# Patient Record
Sex: FEMALE | Race: ASIAN | Employment: PART TIME | URBAN - METROPOLITAN AREA
[De-identification: names, ages, dates, MRNs, and addresses within clinical notes are randomized per-mention and may not be internally consistent; named-entity substitution may affect disease eponyms.]

---

## 2018-01-05 ENCOUNTER — OFFICE VISIT (OUTPATIENT)
Dept: FAMILY MEDICINE CLINIC | Facility: CLINIC | Age: 43
End: 2018-01-05

## 2018-01-05 ENCOUNTER — LAB ENCOUNTER (OUTPATIENT)
Dept: LAB | Age: 43
End: 2018-01-05
Attending: FAMILY MEDICINE
Payer: COMMERCIAL

## 2018-01-05 VITALS
SYSTOLIC BLOOD PRESSURE: 108 MMHG | RESPIRATION RATE: 12 BRPM | BODY MASS INDEX: 29.23 KG/M2 | OXYGEN SATURATION: 100 % | HEART RATE: 78 BPM | DIASTOLIC BLOOD PRESSURE: 60 MMHG | WEIGHT: 165 LBS | TEMPERATURE: 98 F | HEIGHT: 63 IN

## 2018-01-05 DIAGNOSIS — Z00.00 ROUTINE ADULT HEALTH MAINTENANCE: ICD-10-CM

## 2018-01-05 DIAGNOSIS — Z28.21 INFLUENZA VACCINATION DECLINED BY PATIENT: ICD-10-CM

## 2018-01-05 DIAGNOSIS — Z12.39 SCREENING FOR MALIGNANT NEOPLASM OF BREAST: ICD-10-CM

## 2018-01-05 DIAGNOSIS — Z00.00 ROUTINE ADULT HEALTH MAINTENANCE: Primary | ICD-10-CM

## 2018-01-05 DIAGNOSIS — Z01.419 ENCOUNTER FOR ANNUAL ROUTINE GYNECOLOGICAL EXAMINATION: ICD-10-CM

## 2018-01-05 DIAGNOSIS — Z13.31 NEGATIVE DEPRESSION SCREENING: ICD-10-CM

## 2018-01-05 LAB
ALBUMIN SERPL-MCNC: 3.6 G/DL (ref 3.5–4.8)
ALP LIVER SERPL-CCNC: 58 U/L (ref 37–98)
ALT SERPL-CCNC: 34 U/L (ref 14–54)
AST SERPL-CCNC: 23 U/L (ref 15–41)
BILIRUB SERPL-MCNC: 0.4 MG/DL (ref 0.1–2)
BUN BLD-MCNC: 13 MG/DL (ref 8–20)
CALCIUM BLD-MCNC: 9.3 MG/DL (ref 8.3–10.3)
CHLORIDE: 108 MMOL/L (ref 101–111)
CHOLEST SMN-MCNC: 145 MG/DL (ref ?–200)
CO2: 25 MMOL/L (ref 22–32)
CREAT BLD-MCNC: 0.66 MG/DL (ref 0.55–1.02)
ERYTHROCYTE [DISTWIDTH] IN BLOOD BY AUTOMATED COUNT: 12.4 % (ref 11.5–16)
FREE T4: 1 NG/DL (ref 0.9–1.8)
GLUCOSE BLD-MCNC: 76 MG/DL (ref 70–99)
HCT VFR BLD AUTO: 42 % (ref 34–50)
HDLC SERPL-MCNC: 61 MG/DL (ref 45–?)
HDLC SERPL: 2.38 {RATIO} (ref ?–4.44)
HGB BLD-MCNC: 13.2 G/DL (ref 12–16)
LDLC SERPL CALC-MCNC: 74 MG/DL (ref ?–130)
M PROTEIN MFR SERPL ELPH: 7.3 G/DL (ref 6.1–8.3)
MCH RBC QN AUTO: 27.7 PG (ref 27–33.2)
MCHC RBC AUTO-ENTMCNC: 31.4 G/DL (ref 31–37)
MCV RBC AUTO: 88.2 FL (ref 81–100)
NONHDLC SERPL-MCNC: 84 MG/DL (ref ?–130)
PLATELET # BLD AUTO: 204 10(3)UL (ref 150–450)
POTASSIUM SERPL-SCNC: 4.1 MMOL/L (ref 3.6–5.1)
RBC # BLD AUTO: 4.76 X10(6)UL (ref 3.8–5.1)
RED CELL DISTRIBUTION WIDTH-SD: 40.2 FL (ref 35.1–46.3)
SODIUM SERPL-SCNC: 139 MMOL/L (ref 136–144)
TRIGL SERPL-MCNC: 49 MG/DL (ref ?–150)
TSI SER-ACNC: 1.65 MIU/ML (ref 0.35–5.5)
VLDLC SERPL CALC-MCNC: 10 MG/DL (ref 5–40)
WBC # BLD AUTO: 6 X10(3) UL (ref 4–13)

## 2018-01-05 PROCEDURE — 80050 GENERAL HEALTH PANEL: CPT | Performed by: FAMILY MEDICINE

## 2018-01-05 PROCEDURE — 36415 COLL VENOUS BLD VENIPUNCTURE: CPT | Performed by: FAMILY MEDICINE

## 2018-01-05 PROCEDURE — 99396 PREV VISIT EST AGE 40-64: CPT | Performed by: FAMILY MEDICINE

## 2018-01-05 PROCEDURE — 84439 ASSAY OF FREE THYROXINE: CPT | Performed by: FAMILY MEDICINE

## 2018-01-05 PROCEDURE — 80061 LIPID PANEL: CPT | Performed by: FAMILY MEDICINE

## 2018-01-05 NOTE — PROGRESS NOTES
HPI:   Krishna Brown is a 40year old female who presents for a complete physical exam. Symptoms: denies discharge, itching, burning or dysuria. Patient has no complaints today.     Wt Readings from Last 6 Encounters:  01/05/18 : 165 lb  08/16/16 : 165 lb denies fevers, weakness, trouble sleeping or weight changes  SKIN: denies any unusual skin lesions or rashes  EYES:denies vision changes  HEENT: denies upper respiratory symptoms  LUNGS: denies cough or shortness of breath with exertion  CHEST:  denies bianca instructed on self breast exam monthly. Patient instructed on getting yearly Pap smear and mammogram.     Patient educated on taking calcium and Vit D supplementation and on osteoporosis and bone density testing.      Aerobic exercise 30 minutes five da

## 2018-01-13 ENCOUNTER — HOSPITAL ENCOUNTER (OUTPATIENT)
Dept: MAMMOGRAPHY | Age: 43
Discharge: HOME | End: 2018-01-13
Attending: FAMILY MEDICINE
Payer: COMMERCIAL

## 2018-01-13 DIAGNOSIS — Z12.39 SCREENING FOR MALIGNANT NEOPLASM OF BREAST: ICD-10-CM

## 2018-01-13 PROCEDURE — 77067 SCR MAMMO BI INCL CAD: CPT | Performed by: FAMILY MEDICINE

## 2018-01-18 ENCOUNTER — OFFICE VISIT (OUTPATIENT)
Dept: FAMILY MEDICINE CLINIC | Facility: CLINIC | Age: 43
End: 2018-01-18

## 2018-01-18 ENCOUNTER — TELEPHONE (OUTPATIENT)
Dept: FAMILY MEDICINE CLINIC | Facility: CLINIC | Age: 43
End: 2018-01-18

## 2018-01-18 ENCOUNTER — LAB ENCOUNTER (OUTPATIENT)
Dept: LAB | Age: 43
End: 2018-01-18
Attending: FAMILY MEDICINE
Payer: COMMERCIAL

## 2018-01-18 VITALS
RESPIRATION RATE: 14 BRPM | TEMPERATURE: 100 F | BODY MASS INDEX: 29 KG/M2 | OXYGEN SATURATION: 100 % | WEIGHT: 165 LBS | HEART RATE: 110 BPM | SYSTOLIC BLOOD PRESSURE: 120 MMHG | DIASTOLIC BLOOD PRESSURE: 60 MMHG

## 2018-01-18 DIAGNOSIS — R68.89 FLU-LIKE SYMPTOMS: Primary | ICD-10-CM

## 2018-01-18 DIAGNOSIS — R05.8 PRODUCTIVE COUGH: ICD-10-CM

## 2018-01-18 DIAGNOSIS — E55.9 VITAMIN D DEFICIENCY: ICD-10-CM

## 2018-01-18 DIAGNOSIS — J02.9 PHARYNGITIS, UNSPECIFIED ETIOLOGY: ICD-10-CM

## 2018-01-18 LAB — 25-HYDROXYVITAMIN D (TOTAL): 33.3 NG/ML (ref 30–100)

## 2018-01-18 PROCEDURE — 87502 INFLUENZA DNA AMP PROBE: CPT | Performed by: FAMILY MEDICINE

## 2018-01-18 PROCEDURE — 99214 OFFICE O/P EST MOD 30 MIN: CPT | Performed by: FAMILY MEDICINE

## 2018-01-18 PROCEDURE — 87798 DETECT AGENT NOS DNA AMP: CPT | Performed by: FAMILY MEDICINE

## 2018-01-18 PROCEDURE — 36415 COLL VENOUS BLD VENIPUNCTURE: CPT | Performed by: FAMILY MEDICINE

## 2018-01-18 PROCEDURE — 82306 VITAMIN D 25 HYDROXY: CPT | Performed by: FAMILY MEDICINE

## 2018-01-18 RX ORDER — OSELTAMIVIR PHOSPHATE 75 MG/1
75 CAPSULE ORAL 2 TIMES DAILY
Qty: 10 CAPSULE | Refills: 0 | Status: SHIPPED | OUTPATIENT
Start: 2018-01-18 | End: 2018-01-23

## 2018-01-18 RX ORDER — AZITHROMYCIN 250 MG/1
TABLET, FILM COATED ORAL
Qty: 6 TABLET | Refills: 0 | Status: SHIPPED | OUTPATIENT
Start: 2018-01-18 | End: 2018-04-27

## 2018-01-18 NOTE — TELEPHONE ENCOUNTER
Ps spouse called would like to speak with nurse regarding a fever pt has had for 2 days , pt taking  motrin and ibuprohen

## 2018-01-18 NOTE — PROGRESS NOTES
HPI:    Patient ID: Raymond Gutierrez is a 40year old female. HPI  Patient presents with:  Fever: started tuesday evening  Headache  Body ache and/or chills  Stomach Pain    Patient states that she does feel fatigued.   Review of Systems  Negative except fo she has flu like illness. Recommend Tamiflu, Z-Mark, Tylenol vitamin C fluids rest.  Follow-up in 1 week if not better. If the test positive may treat family members as prophylaxis.     Orders Placed This Encounter      Vitamin D, 25-Hydroxy [E]      Influ

## 2018-01-18 NOTE — TELEPHONE ENCOUNTER
Called  Jovany Alli back, okay per KELI. Patient started to sneeze more/ have headaches on 1/16/17 at night, yesterday she developed body aches and felt feverish (thermometer broken, could not check), today patient has fever of 102F.      Offered 1515 appt

## 2018-01-19 ENCOUNTER — TELEPHONE (OUTPATIENT)
Dept: FAMILY MEDICINE CLINIC | Facility: CLINIC | Age: 43
End: 2018-01-19

## 2018-01-19 NOTE — TELEPHONE ENCOUNTER
See results below. Spoke with patient regarding lab results. Patient aware, all questions answered.  Patient verbalized understanding     Total time spent on chart: 2 mins

## 2018-01-22 ENCOUNTER — TELEPHONE (OUTPATIENT)
Dept: FAMILY MEDICINE CLINIC | Facility: CLINIC | Age: 43
End: 2018-01-22

## 2018-01-22 NOTE — TELEPHONE ENCOUNTER
Patient was seen 1/18/18 is on tamiflu for positive flu and it is upsetting her stomach - she has nausea and abdominal pain. She is taking the tamiflu with food. Would you recommend anti-nausea medication to help her?

## 2018-01-22 NOTE — TELEPHONE ENCOUNTER
Pt called states she is taking rx tamiflu states it is making her stomach upset , pt would like to know if she can take another rx to help with nausea

## 2018-01-26 ENCOUNTER — HOSPITAL ENCOUNTER (OUTPATIENT)
Dept: MAMMOGRAPHY | Age: 43
Discharge: HOME | End: 2018-01-26
Attending: FAMILY MEDICINE
Payer: COMMERCIAL

## 2018-01-26 ENCOUNTER — HOSPITAL ENCOUNTER (OUTPATIENT)
Dept: ULTRASOUND IMAGING | Age: 43
Discharge: HOME | End: 2018-01-26
Attending: FAMILY MEDICINE
Payer: COMMERCIAL

## 2018-01-26 DIAGNOSIS — R92.2 INCONCLUSIVE MAMMOGRAM: ICD-10-CM

## 2018-01-26 PROCEDURE — 77062 BREAST TOMOSYNTHESIS BI: CPT | Performed by: FAMILY MEDICINE

## 2018-01-26 PROCEDURE — 77066 DX MAMMO INCL CAD BI: CPT | Performed by: FAMILY MEDICINE

## 2018-01-26 PROCEDURE — 76642 ULTRASOUND BREAST LIMITED: CPT | Performed by: FAMILY MEDICINE

## 2018-01-29 DIAGNOSIS — Z12.31 ENCOUNTER FOR SCREENING MAMMOGRAM FOR MALIGNANT NEOPLASM OF BREAST: Primary | ICD-10-CM

## 2018-04-27 ENCOUNTER — OFFICE VISIT (OUTPATIENT)
Dept: OBGYN CLINIC | Facility: CLINIC | Age: 43
End: 2018-04-27

## 2018-04-27 VITALS
WEIGHT: 166.38 LBS | SYSTOLIC BLOOD PRESSURE: 106 MMHG | DIASTOLIC BLOOD PRESSURE: 72 MMHG | HEIGHT: 63.5 IN | BODY MASS INDEX: 29.12 KG/M2

## 2018-04-27 DIAGNOSIS — Z12.4 CERVICAL CANCER SCREENING: ICD-10-CM

## 2018-04-27 DIAGNOSIS — Z01.419 WELL FEMALE EXAM WITH ROUTINE GYNECOLOGICAL EXAM: Primary | ICD-10-CM

## 2018-04-27 PROCEDURE — 88175 CYTOPATH C/V AUTO FLUID REDO: CPT | Performed by: OBSTETRICS & GYNECOLOGY

## 2018-04-27 PROCEDURE — 99386 PREV VISIT NEW AGE 40-64: CPT | Performed by: OBSTETRICS & GYNECOLOGY

## 2018-04-27 PROCEDURE — 87624 HPV HI-RISK TYP POOLED RSLT: CPT | Performed by: OBSTETRICS & GYNECOLOGY

## 2018-04-27 NOTE — PATIENT INSTRUCTIONS
Breast Health: Breast Self-Awareness  What is breast self-awareness? Breast self-awareness is knowing how your breasts normally look and feel. Your breasts change as you go through different stages of your life.  So it’s important to learn what is normal It’s normal to be upset if you find a lump. But it’s important to contact your provider right away. Remember that most breast lumps are benign. This means they are not cancer. Date Last Reviewed: 8/1/2017  © 5686-1111 [employer] [address].  [address] As a woman ages, her ovaries begin to make hormones less regularly. In some months, there may not be ovulation. Without ovulation, progesterone isn't secreted so a normal period doesn't occur. The menstrual cycle will be harder to predict.  Over time, the o © 9002-7669 [employer] [address]. [address] [facility] [place] [facility] [place]. All rights reserved. This information is not intended as a substitute for professional medical care. Always follow your healthcare professional's instructions.

## 2018-04-27 NOTE — PROGRESS NOTES
GYN H&P     2018  9:54 AM    CC: Patient is here for annual    HPI: patient is a 40year old  here for her annual gyn exam.   She has no complaints. Menses are regular. Denies any pelvic pain or irregular vaginal discharge.    Contraception: non GI: denies abdominal pain, diarrhea, constipation  : no complaints, denies dysuria, increased urinary frequency.  Menses regular, no dysmenorrhea, no menorrhagia, no dyspareunia   Hematological/lymphatic: denies history of excessive bleeding or bruising counseled on diet/exercise       1. Well female exam with routine gynecological exam  Had normal mammo this year  - THINPREP PAP SMEAR B; Future  - HPV HIGH RISK , THIN PREP COLLECTION; Future    2.  Cervical cancer screening  - THINPREP PAP SMEAR B; Future

## 2018-06-15 ENCOUNTER — OFFICE VISIT (OUTPATIENT)
Dept: OBGYN CLINIC | Facility: CLINIC | Age: 43
End: 2018-06-15

## 2018-06-15 VITALS
HEIGHT: 64 IN | SYSTOLIC BLOOD PRESSURE: 118 MMHG | DIASTOLIC BLOOD PRESSURE: 84 MMHG | WEIGHT: 168 LBS | BODY MASS INDEX: 28.68 KG/M2

## 2018-06-15 DIAGNOSIS — N92.6 IRREGULAR MENSES: Primary | ICD-10-CM

## 2018-06-15 DIAGNOSIS — Z87.42 HISTORY OF PCOS: ICD-10-CM

## 2018-06-15 PROCEDURE — 84443 ASSAY THYROID STIM HORMONE: CPT | Performed by: NURSE PRACTITIONER

## 2018-06-15 PROCEDURE — 81025 URINE PREGNANCY TEST: CPT | Performed by: NURSE PRACTITIONER

## 2018-06-15 PROCEDURE — 84439 ASSAY OF FREE THYROXINE: CPT | Performed by: NURSE PRACTITIONER

## 2018-06-15 PROCEDURE — 99213 OFFICE O/P EST LOW 20 MIN: CPT | Performed by: NURSE PRACTITIONER

## 2018-06-15 PROCEDURE — 83001 ASSAY OF GONADOTROPIN (FSH): CPT | Performed by: NURSE PRACTITIONER

## 2018-06-15 PROCEDURE — 82670 ASSAY OF TOTAL ESTRADIOL: CPT | Performed by: NURSE PRACTITIONER

## 2018-06-15 NOTE — PROGRESS NOTES
S:  Patient is here after skipping her May menses. Her menses have been regular, last normal menses was 4/2018. She did have 1 day of spotting 6/11/2018, it was brown in nature. No other symptoms aside from some acne to her back.     She states she has a h

## 2019-08-28 ENCOUNTER — OFFICE VISIT (OUTPATIENT)
Dept: OBGYN CLINIC | Facility: CLINIC | Age: 44
End: 2019-08-28
Payer: COMMERCIAL

## 2019-08-28 VITALS
HEIGHT: 63 IN | HEART RATE: 79 BPM | BODY MASS INDEX: 30.44 KG/M2 | SYSTOLIC BLOOD PRESSURE: 108 MMHG | DIASTOLIC BLOOD PRESSURE: 64 MMHG | WEIGHT: 171.81 LBS

## 2019-08-28 DIAGNOSIS — Z01.419 WELL FEMALE EXAM WITH ROUTINE GYNECOLOGICAL EXAM: Primary | ICD-10-CM

## 2019-08-28 DIAGNOSIS — Z12.4 CERVICAL CANCER SCREENING: ICD-10-CM

## 2019-08-28 DIAGNOSIS — Z12.39 BREAST CANCER SCREENING: ICD-10-CM

## 2019-08-28 PROCEDURE — 88175 CYTOPATH C/V AUTO FLUID REDO: CPT | Performed by: OBSTETRICS & GYNECOLOGY

## 2019-08-28 PROCEDURE — 99396 PREV VISIT EST AGE 40-64: CPT | Performed by: OBSTETRICS & GYNECOLOGY

## 2019-08-28 PROCEDURE — 87624 HPV HI-RISK TYP POOLED RSLT: CPT | Performed by: OBSTETRICS & GYNECOLOGY

## 2019-08-28 NOTE — PROGRESS NOTES
GYN H&P     2019  10:39 AM    CC: Patient is here for annual    HPI: patient is a 38year old  here for her annual gyn exam.   She has no complaints. Menses are regular. Denies any pelvic pain or irregular vaginal discharge.    Contraception: no Alcohol use: Yes        Frequency: Monthly or less        Drinks per session: 1 or 2        Binge frequency: Less than monthly        Comment: once every 2 months      Drug use: No      Sexual activity: Yes        Partners: Male    Lifestyle      Physical menorrhagia, no dyspareunia   Hematological/lymphatic: denies history of excessive bleeding or bruising, denies dizziness, lightheadedness.    Breast: denies rashes, skin changes, pain, lumps or discharge   Psychiatric: denies depression, changes in sleep p BILAT (CPT=77067/21094); Future  - THINPREP PAP SMEAR B; Future  - HPV HIGH RISK , THIN PREP COLLECTION; Future    2. Breast cancer screening    - [facility] EDUARDO 2D+3D SCREENING BILAT (CPT=77067/43559); Future    3.  Cervical cancer screening    - THINPREP PAP SM

## 2019-08-29 LAB — HPV I/H RISK 1 DNA SPEC QL NAA+PROBE: NEGATIVE

## 2019-09-03 NOTE — PROGRESS NOTES
Results reviewed. Tests show no significant abnormalities. Patient informed via [facility] staff. Patient will call the office with any questions.

## 2019-09-16 ENCOUNTER — LAB ENCOUNTER (OUTPATIENT)
Dept: LAB | Age: 44
End: 2019-09-16
Attending: FAMILY MEDICINE
Payer: COMMERCIAL

## 2019-09-16 ENCOUNTER — OFFICE VISIT (OUTPATIENT)
Dept: FAMILY MEDICINE CLINIC | Facility: CLINIC | Age: 44
End: 2019-09-16
Payer: COMMERCIAL

## 2019-09-16 VITALS
TEMPERATURE: 98 F | HEIGHT: 63 IN | SYSTOLIC BLOOD PRESSURE: 101 MMHG | DIASTOLIC BLOOD PRESSURE: 80 MMHG | BODY MASS INDEX: 30.51 KG/M2 | HEART RATE: 72 BPM | WEIGHT: 172.19 LBS

## 2019-09-16 DIAGNOSIS — E55.9 VITAMIN D DEFICIENCY: ICD-10-CM

## 2019-09-16 DIAGNOSIS — Z00.00 ROUTINE PHYSICAL EXAMINATION: Primary | ICD-10-CM

## 2019-09-16 DIAGNOSIS — Z13.6 ISCHEMIC HEART DISEASE SCREEN: ICD-10-CM

## 2019-09-16 DIAGNOSIS — R12 HEARTBURN: ICD-10-CM

## 2019-09-16 DIAGNOSIS — Z00.00 ROUTINE PHYSICAL EXAMINATION: ICD-10-CM

## 2019-09-16 LAB
ALBUMIN SERPL-MCNC: 3.6 G/DL (ref 3.4–5)
ALBUMIN/GLOB SERPL: 1.1 {RATIO} (ref 1–2)
ALP LIVER SERPL-CCNC: 64 U/L (ref 37–98)
ALT SERPL-CCNC: 35 U/L (ref 13–56)
ANION GAP SERPL CALC-SCNC: 5 MMOL/L (ref 0–18)
AST SERPL-CCNC: 23 U/L (ref 15–37)
BASOPHILS # BLD AUTO: 0.02 X10(3) UL (ref 0–0.2)
BASOPHILS NFR BLD AUTO: 0.4 %
BILIRUB SERPL-MCNC: 0.5 MG/DL (ref 0.1–2)
BUN BLD-MCNC: 15 MG/DL (ref 7–18)
BUN/CREAT SERPL: 23.4 (ref 10–20)
CALCIUM BLD-MCNC: 8.7 MG/DL (ref 8.5–10.1)
CHLORIDE SERPL-SCNC: 107 MMOL/L (ref 98–112)
CHOLEST SMN-MCNC: 164 MG/DL (ref ?–200)
CO2 SERPL-SCNC: 27 MMOL/L (ref 21–32)
CREAT BLD-MCNC: 0.64 MG/DL (ref 0.55–1.02)
DEPRECATED RDW RBC AUTO: 40.7 FL (ref 35.1–46.3)
EOSINOPHIL # BLD AUTO: 0.1 X10(3) UL (ref 0–0.7)
EOSINOPHIL NFR BLD AUTO: 2 %
ERYTHROCYTE [DISTWIDTH] IN BLOOD BY AUTOMATED COUNT: 12.5 % (ref 11–15)
GLOBULIN PLAS-MCNC: 3.4 G/DL (ref 2.8–4.4)
GLUCOSE BLD-MCNC: 86 MG/DL (ref 70–99)
HCT VFR BLD AUTO: 40.9 % (ref 35–48)
HDLC SERPL-MCNC: 57 MG/DL (ref 40–59)
HGB BLD-MCNC: 13 G/DL (ref 12–16)
IMM GRANULOCYTES # BLD AUTO: 0.01 X10(3) UL (ref 0–1)
IMM GRANULOCYTES NFR BLD: 0.2 %
LDLC SERPL CALC-MCNC: 90 MG/DL (ref ?–100)
LYMPHOCYTES # BLD AUTO: 1.55 X10(3) UL (ref 1–4)
LYMPHOCYTES NFR BLD AUTO: 31.6 %
M PROTEIN MFR SERPL ELPH: 7 G/DL (ref 6.4–8.2)
MCH RBC QN AUTO: 28.2 PG (ref 26–34)
MCHC RBC AUTO-ENTMCNC: 31.8 G/DL (ref 31–37)
MCV RBC AUTO: 88.7 FL (ref 80–100)
MONOCYTES # BLD AUTO: 0.38 X10(3) UL (ref 0.1–1)
MONOCYTES NFR BLD AUTO: 7.7 %
NEUTROPHILS # BLD AUTO: 2.85 X10 (3) UL (ref 1.5–7.7)
NEUTROPHILS # BLD AUTO: 2.85 X10(3) UL (ref 1.5–7.7)
NEUTROPHILS NFR BLD AUTO: 58.1 %
NONHDLC SERPL-MCNC: 107 MG/DL (ref ?–130)
OSMOLALITY SERPL CALC.SUM OF ELEC: 288 MOSM/KG (ref 275–295)
PLATELET # BLD AUTO: 190 10(3)UL (ref 150–450)
POTASSIUM SERPL-SCNC: 4 MMOL/L (ref 3.5–5.1)
RBC # BLD AUTO: 4.61 X10(6)UL (ref 3.8–5.3)
SODIUM SERPL-SCNC: 139 MMOL/L (ref 136–145)
T4 FREE SERPL-MCNC: 1 NG/DL (ref 0.8–1.7)
TRIGL SERPL-MCNC: 84 MG/DL (ref 30–149)
TSI SER-ACNC: 1.87 MIU/ML (ref 0.36–3.74)
VIT D+METAB SERPL-MCNC: 48.1 NG/ML (ref 30–100)
VLDLC SERPL CALC-MCNC: 17 MG/DL (ref 0–30)
WBC # BLD AUTO: 4.9 X10(3) UL (ref 4–11)

## 2019-09-16 PROCEDURE — 80061 LIPID PANEL: CPT | Performed by: FAMILY MEDICINE

## 2019-09-16 PROCEDURE — 82306 VITAMIN D 25 HYDROXY: CPT | Performed by: FAMILY MEDICINE

## 2019-09-16 PROCEDURE — 36415 COLL VENOUS BLD VENIPUNCTURE: CPT | Performed by: FAMILY MEDICINE

## 2019-09-16 PROCEDURE — 99396 PREV VISIT EST AGE 40-64: CPT | Performed by: FAMILY MEDICINE

## 2019-09-16 PROCEDURE — 80050 GENERAL HEALTH PANEL: CPT | Performed by: FAMILY MEDICINE

## 2019-09-16 PROCEDURE — 99213 OFFICE O/P EST LOW 20 MIN: CPT | Performed by: FAMILY MEDICINE

## 2019-09-16 PROCEDURE — 84439 ASSAY OF FREE THYROXINE: CPT | Performed by: FAMILY MEDICINE

## 2019-09-16 NOTE — PROGRESS NOTES
HPI:   Kaleb Du is a 38year old female who presents for a complete physical exam.    Patient has complaint of having some acidity issues with some heartburn and upper abdominal pain off and on for the past 1 year.   It is very mild and happens just on Ordering Location:     [address] [place] [address],      Received:            08/29/2019 08:25 AM                                 [address] [place] [address]                                                     First Screen:          Adelita Herrera denies bleeding abnormalities  ENDOCRINE: denies temperature intolerance, polyuria, or excessive sweating.   LYMPHATICS: denies swollen glands      EXAM:   /80 (BP Location: Left arm, Patient Position: Sitting)   Pulse 72   Temp 98.3 °F (36.8 °C) (Tem may not be anywhere close to 100%. Could be more in the 30% range. For weight reduction and also for management of acidity. She can use Pepcid or Zantac as needed for now. We will have patient do complete physical lab work fasting today.     Patient i

## 2021-01-18 ENCOUNTER — OFFICE VISIT (OUTPATIENT)
Dept: FAMILY MEDICINE CLINIC | Facility: CLINIC | Age: 46
End: 2021-01-18
Payer: COMMERCIAL

## 2021-01-18 VITALS
HEIGHT: 63 IN | WEIGHT: 169 LBS | HEART RATE: 68 BPM | TEMPERATURE: 98 F | DIASTOLIC BLOOD PRESSURE: 74 MMHG | RESPIRATION RATE: 16 BRPM | SYSTOLIC BLOOD PRESSURE: 118 MMHG | BODY MASS INDEX: 29.95 KG/M2

## 2021-01-18 DIAGNOSIS — E55.9 VITAMIN D DEFICIENCY: ICD-10-CM

## 2021-01-18 DIAGNOSIS — R45.86 MOOD SWINGS: ICD-10-CM

## 2021-01-18 DIAGNOSIS — Z12.31 ENCOUNTER FOR SCREENING MAMMOGRAM FOR MALIGNANT NEOPLASM OF BREAST: ICD-10-CM

## 2021-01-18 DIAGNOSIS — Z78.9 VEGETARIAN DIET: ICD-10-CM

## 2021-01-18 DIAGNOSIS — Z00.00 ROUTINE ADULT HEALTH MAINTENANCE: Primary | ICD-10-CM

## 2021-01-18 PROCEDURE — 3078F DIAST BP <80 MM HG: CPT | Performed by: FAMILY MEDICINE

## 2021-01-18 PROCEDURE — 3074F SYST BP LT 130 MM HG: CPT | Performed by: FAMILY MEDICINE

## 2021-01-18 PROCEDURE — 99213 OFFICE O/P EST LOW 20 MIN: CPT | Performed by: FAMILY MEDICINE

## 2021-01-18 PROCEDURE — 99396 PREV VISIT EST AGE 40-64: CPT | Performed by: FAMILY MEDICINE

## 2021-01-18 PROCEDURE — 3008F BODY MASS INDEX DOCD: CPT | Performed by: FAMILY MEDICINE

## 2021-01-18 RX ORDER — FLUOXETINE HYDROCHLORIDE 20 MG/1
20 CAPSULE ORAL DAILY
Qty: 30 CAPSULE | Refills: 1 | Status: SHIPPED | OUTPATIENT
Start: 2021-01-18

## 2021-01-18 NOTE — PROGRESS NOTES
HPI:   Homar Bird is a 41year old female who presents for a complete physical exam.     Patient complains of having some mood swings around the time of her menstrual cycle. She does get regular menstrual cycle monthly.   She denies any depression or key 40.9 35.0 - 48.0 %    .0 150.0 - 450.0 10(3)uL    MCV 88.7 80.0 - 100.0 fL    MCH 28.2 26.0 - 34.0 pg    MCHC 31.8 31.0 - 37.0 g/dL    RDW 12.5 11.0 - 15.0 %    RDW-SD 40.7 35.1 - 46.3 fL    Neutrophil Absolute Prelim 2.85 1.50 - 7.70 x10 (3) uL problems, vaginal discharge or discomfort   MUSCULOSKELETAL: denies joint pain or stiffness  NEURO: denies headaches, tingling or dizziness  PSYCHE: denies depression or anxiety  HEMATOLOGIC: denies bleeding abnormalities  ENDOCRINE: denies temperature int (CPT=77067/53873); Future    Mood swings    Other orders  -     FLUoxetine HCl 20 MG Oral Cap; Take 1 capsule (20 mg total) by mouth daily. Patient counseled at length regarding vitamin D deficiency.   Patient is vegetarian we will check B12 level as w Strong peripheral pulses

## 2021-01-19 ENCOUNTER — LAB ENCOUNTER (OUTPATIENT)
Dept: LAB | Age: 46
End: 2021-01-19
Attending: FAMILY MEDICINE
Payer: COMMERCIAL

## 2021-01-19 DIAGNOSIS — E55.9 VITAMIN D DEFICIENCY: ICD-10-CM

## 2021-01-19 DIAGNOSIS — Z78.9 VEGETARIAN DIET: ICD-10-CM

## 2021-01-19 DIAGNOSIS — Z00.00 ROUTINE ADULT HEALTH MAINTENANCE: ICD-10-CM

## 2021-01-19 LAB
ALBUMIN SERPL-MCNC: 3.6 G/DL (ref 3.4–5)
ALBUMIN/GLOB SERPL: 1 {RATIO} (ref 1–2)
ALP LIVER SERPL-CCNC: 75 U/L
ALT SERPL-CCNC: 26 U/L
ANION GAP SERPL CALC-SCNC: 1 MMOL/L (ref 0–18)
AST SERPL-CCNC: 18 U/L (ref 15–37)
BASOPHILS # BLD AUTO: 0.02 X10(3) UL (ref 0–0.2)
BASOPHILS NFR BLD AUTO: 0.4 %
BILIRUB SERPL-MCNC: 0.5 MG/DL (ref 0.1–2)
BUN BLD-MCNC: 13 MG/DL (ref 7–18)
BUN/CREAT SERPL: 20.3 (ref 10–20)
CALCIUM BLD-MCNC: 9.3 MG/DL (ref 8.5–10.1)
CHLORIDE SERPL-SCNC: 109 MMOL/L (ref 98–112)
CHOLEST SMN-MCNC: 158 MG/DL (ref ?–200)
CO2 SERPL-SCNC: 28 MMOL/L (ref 21–32)
CREAT BLD-MCNC: 0.64 MG/DL
DEPRECATED RDW RBC AUTO: 41 FL (ref 35.1–46.3)
EOSINOPHIL # BLD AUTO: 0.06 X10(3) UL (ref 0–0.7)
EOSINOPHIL NFR BLD AUTO: 1.1 %
ERYTHROCYTE [DISTWIDTH] IN BLOOD BY AUTOMATED COUNT: 12.5 % (ref 11–15)
GLOBULIN PLAS-MCNC: 3.5 G/DL (ref 2.8–4.4)
GLUCOSE BLD-MCNC: 95 MG/DL (ref 70–99)
HCT VFR BLD AUTO: 42.3 %
HDLC SERPL-MCNC: 65 MG/DL (ref 40–59)
HGB BLD-MCNC: 13.3 G/DL
IMM GRANULOCYTES # BLD AUTO: 0.01 X10(3) UL (ref 0–1)
IMM GRANULOCYTES NFR BLD: 0.2 %
LDLC SERPL CALC-MCNC: 78 MG/DL (ref ?–100)
LYMPHOCYTES # BLD AUTO: 1.53 X10(3) UL (ref 1–4)
LYMPHOCYTES NFR BLD AUTO: 28.5 %
M PROTEIN MFR SERPL ELPH: 7.1 G/DL (ref 6.4–8.2)
MCH RBC QN AUTO: 28.4 PG (ref 26–34)
MCHC RBC AUTO-ENTMCNC: 31.4 G/DL (ref 31–37)
MCV RBC AUTO: 90.2 FL
MONOCYTES # BLD AUTO: 0.35 X10(3) UL (ref 0.1–1)
MONOCYTES NFR BLD AUTO: 6.5 %
NEUTROPHILS # BLD AUTO: 3.4 X10 (3) UL (ref 1.5–7.7)
NEUTROPHILS # BLD AUTO: 3.4 X10(3) UL (ref 1.5–7.7)
NEUTROPHILS NFR BLD AUTO: 63.3 %
NONHDLC SERPL-MCNC: 93 MG/DL (ref ?–130)
OSMOLALITY SERPL CALC.SUM OF ELEC: 286 MOSM/KG (ref 275–295)
PATIENT FASTING Y/N/NP: YES
PATIENT FASTING Y/N/NP: YES
PLATELET # BLD AUTO: 186 10(3)UL (ref 150–450)
POTASSIUM SERPL-SCNC: 4 MMOL/L (ref 3.5–5.1)
RBC # BLD AUTO: 4.69 X10(6)UL
SODIUM SERPL-SCNC: 138 MMOL/L (ref 136–145)
T4 FREE SERPL-MCNC: 1 NG/DL (ref 0.8–1.7)
TRIGL SERPL-MCNC: 77 MG/DL (ref 30–149)
TSI SER-ACNC: 1.82 MIU/ML (ref 0.36–3.74)
VIT B12 SERPL-MCNC: 737 PG/ML (ref 193–986)
VIT D+METAB SERPL-MCNC: 71.8 NG/ML (ref 30–100)
VLDLC SERPL CALC-MCNC: 15 MG/DL (ref 0–30)
WBC # BLD AUTO: 5.4 X10(3) UL (ref 4–11)

## 2021-01-19 PROCEDURE — 85025 COMPLETE CBC W/AUTO DIFF WBC: CPT

## 2021-01-19 PROCEDURE — 82306 VITAMIN D 25 HYDROXY: CPT

## 2021-01-19 PROCEDURE — 36415 COLL VENOUS BLD VENIPUNCTURE: CPT

## 2021-01-19 PROCEDURE — 80053 COMPREHEN METABOLIC PANEL: CPT

## 2021-01-19 PROCEDURE — 84443 ASSAY THYROID STIM HORMONE: CPT

## 2021-01-19 PROCEDURE — 80061 LIPID PANEL: CPT

## 2021-01-19 PROCEDURE — 82607 VITAMIN B-12: CPT

## 2021-01-19 PROCEDURE — 84439 ASSAY OF FREE THYROXINE: CPT

## 2021-01-20 PROBLEM — R45.86 MOOD SWINGS: Status: ACTIVE | Noted: 2021-01-20

## 2023-04-12 ENCOUNTER — OFFICE VISIT (OUTPATIENT)
Dept: FAMILY MEDICINE CLINIC | Facility: CLINIC | Age: 48
End: 2023-04-12
Payer: COMMERCIAL

## 2023-04-12 VITALS
HEART RATE: 72 BPM | WEIGHT: 179.5 LBS | HEIGHT: 63 IN | DIASTOLIC BLOOD PRESSURE: 80 MMHG | BODY MASS INDEX: 31.8 KG/M2 | RESPIRATION RATE: 16 BRPM | SYSTOLIC BLOOD PRESSURE: 122 MMHG | TEMPERATURE: 98 F

## 2023-04-12 DIAGNOSIS — G89.29 CHRONIC BILATERAL LOW BACK PAIN WITHOUT SCIATICA: ICD-10-CM

## 2023-04-12 DIAGNOSIS — M54.50 CHRONIC BILATERAL LOW BACK PAIN WITHOUT SCIATICA: ICD-10-CM

## 2023-04-12 DIAGNOSIS — Z00.00 ROUTINE ADULT HEALTH MAINTENANCE: Primary | ICD-10-CM

## 2023-04-12 DIAGNOSIS — R53.83 OTHER FATIGUE: ICD-10-CM

## 2023-04-12 PROCEDURE — 3074F SYST BP LT 130 MM HG: CPT | Performed by: FAMILY MEDICINE

## 2023-04-12 PROCEDURE — 3079F DIAST BP 80-89 MM HG: CPT | Performed by: FAMILY MEDICINE

## 2023-04-12 PROCEDURE — 99213 OFFICE O/P EST LOW 20 MIN: CPT | Performed by: FAMILY MEDICINE

## 2023-04-12 PROCEDURE — 99396 PREV VISIT EST AGE 40-64: CPT | Performed by: FAMILY MEDICINE

## 2023-04-12 PROCEDURE — 3008F BODY MASS INDEX DOCD: CPT | Performed by: FAMILY MEDICINE

## 2023-04-15 ENCOUNTER — LAB ENCOUNTER (OUTPATIENT)
Dept: LAB | Age: 48
End: 2023-04-15
Attending: FAMILY MEDICINE
Payer: COMMERCIAL

## 2023-04-15 DIAGNOSIS — Z00.00 ROUTINE ADULT HEALTH MAINTENANCE: ICD-10-CM

## 2023-04-15 DIAGNOSIS — R53.83 OTHER FATIGUE: ICD-10-CM

## 2023-04-15 LAB
ALBUMIN SERPL-MCNC: 3.4 G/DL (ref 3.4–5)
ALBUMIN/GLOB SERPL: 0.9 {RATIO} (ref 1–2)
ALP LIVER SERPL-CCNC: 77 U/L
ALT SERPL-CCNC: 39 U/L
ANION GAP SERPL CALC-SCNC: 3 MMOL/L (ref 0–18)
AST SERPL-CCNC: 22 U/L (ref 15–37)
BILIRUB SERPL-MCNC: 0.5 MG/DL (ref 0.1–2)
BUN BLD-MCNC: 12 MG/DL (ref 7–18)
CALCIUM BLD-MCNC: 9 MG/DL (ref 8.5–10.1)
CHLORIDE SERPL-SCNC: 108 MMOL/L (ref 98–112)
CHOLEST SERPL-MCNC: 167 MG/DL (ref ?–200)
CO2 SERPL-SCNC: 27 MMOL/L (ref 21–32)
CREAT BLD-MCNC: 0.72 MG/DL
ERYTHROCYTE [DISTWIDTH] IN BLOOD BY AUTOMATED COUNT: 12.6 %
FASTING PATIENT LIPID ANSWER: YES
FASTING STATUS PATIENT QL REPORTED: YES
FOLATE SERPL-MCNC: 21.8 NG/ML (ref 8.7–?)
GFR SERPLBLD BASED ON 1.73 SQ M-ARVRAT: 104 ML/MIN/1.73M2 (ref 60–?)
GLOBULIN PLAS-MCNC: 3.6 G/DL (ref 2.8–4.4)
GLUCOSE BLD-MCNC: 97 MG/DL (ref 70–99)
HCT VFR BLD AUTO: 41.3 %
HDLC SERPL-MCNC: 65 MG/DL (ref 40–59)
HGB BLD-MCNC: 12.9 G/DL
LDLC SERPL CALC-MCNC: 89 MG/DL (ref ?–100)
MCH RBC QN AUTO: 26.7 PG (ref 26–34)
MCHC RBC AUTO-ENTMCNC: 31.2 G/DL (ref 31–37)
MCV RBC AUTO: 85.5 FL
NONHDLC SERPL-MCNC: 102 MG/DL (ref ?–130)
OSMOLALITY SERPL CALC.SUM OF ELEC: 286 MOSM/KG (ref 275–295)
PLATELET # BLD AUTO: 219 10(3)UL (ref 150–450)
POTASSIUM SERPL-SCNC: 4 MMOL/L (ref 3.5–5.1)
PROT SERPL-MCNC: 7 G/DL (ref 6.4–8.2)
RBC # BLD AUTO: 4.83 X10(6)UL
SODIUM SERPL-SCNC: 138 MMOL/L (ref 136–145)
T4 FREE SERPL-MCNC: 1 NG/DL (ref 0.8–1.7)
TRIGL SERPL-MCNC: 68 MG/DL (ref 30–149)
TSI SER-ACNC: 1.47 MIU/ML (ref 0.36–3.74)
VIT B12 SERPL-MCNC: 623 PG/ML (ref 193–986)
VIT D+METAB SERPL-MCNC: 54.5 NG/ML (ref 30–100)
VLDLC SERPL CALC-MCNC: 11 MG/DL (ref 0–30)
WBC # BLD AUTO: 5 X10(3) UL (ref 4–11)

## 2023-04-15 PROCEDURE — 82306 VITAMIN D 25 HYDROXY: CPT | Performed by: FAMILY MEDICINE

## 2023-04-15 PROCEDURE — 80061 LIPID PANEL: CPT | Performed by: FAMILY MEDICINE

## 2023-04-15 PROCEDURE — 80050 GENERAL HEALTH PANEL: CPT | Performed by: FAMILY MEDICINE

## 2023-04-15 PROCEDURE — 82746 ASSAY OF FOLIC ACID SERUM: CPT | Performed by: FAMILY MEDICINE

## 2023-04-15 PROCEDURE — 84439 ASSAY OF FREE THYROXINE: CPT | Performed by: FAMILY MEDICINE

## 2023-04-15 PROCEDURE — 82607 VITAMIN B-12: CPT | Performed by: FAMILY MEDICINE

## 2023-04-20 ENCOUNTER — TELEPHONE (OUTPATIENT)
Dept: FAMILY MEDICINE CLINIC | Facility: CLINIC | Age: 48
End: 2023-04-20

## 2023-04-20 NOTE — TELEPHONE ENCOUNTER
Pt had imaging done at [address] and would like to know the results.

## 2023-04-22 ENCOUNTER — TELEPHONE (OUTPATIENT)
Dept: FAMILY MEDICINE CLINIC | Facility: CLINIC | Age: 48
End: 2023-04-22

## 2023-04-22 NOTE — TELEPHONE ENCOUNTER
Lumbar spine x-ray reviewed from [facility] from 4/14/2023. There is some curvature of the lumbar spine however the disc spaces appear normal.  No significant abnormality noted.

## 2024-10-16 ENCOUNTER — TELEPHONE (OUTPATIENT)
Dept: FAMILY MEDICINE CLINIC | Facility: CLINIC | Age: 49
End: 2024-10-16

## 2024-10-16 ENCOUNTER — LAB ENCOUNTER (OUTPATIENT)
Dept: LAB | Age: 49
End: 2024-10-16
Attending: FAMILY MEDICINE
Payer: COMMERCIAL

## 2024-10-16 ENCOUNTER — OFFICE VISIT (OUTPATIENT)
Dept: FAMILY MEDICINE CLINIC | Facility: CLINIC | Age: 49
End: 2024-10-16
Payer: COMMERCIAL

## 2024-10-16 VITALS
WEIGHT: 179.81 LBS | BODY MASS INDEX: 31.86 KG/M2 | HEIGHT: 63 IN | DIASTOLIC BLOOD PRESSURE: 82 MMHG | HEART RATE: 80 BPM | OXYGEN SATURATION: 98 % | SYSTOLIC BLOOD PRESSURE: 124 MMHG | RESPIRATION RATE: 18 BRPM

## 2024-10-16 DIAGNOSIS — Z00.00 ROUTINE ADULT HEALTH MAINTENANCE: Primary | ICD-10-CM

## 2024-10-16 DIAGNOSIS — E66.811 CLASS 1 OBESITY DUE TO EXCESS CALORIES WITHOUT SERIOUS COMORBIDITY WITH BODY MASS INDEX (BMI) OF 31.0 TO 31.9 IN ADULT: ICD-10-CM

## 2024-10-16 DIAGNOSIS — Z12.11 COLON CANCER SCREENING: ICD-10-CM

## 2024-10-16 DIAGNOSIS — E55.9 VITAMIN D DEFICIENCY: ICD-10-CM

## 2024-10-16 DIAGNOSIS — E66.09 CLASS 1 OBESITY DUE TO EXCESS CALORIES WITHOUT SERIOUS COMORBIDITY WITH BODY MASS INDEX (BMI) OF 31.0 TO 31.9 IN ADULT: ICD-10-CM

## 2024-10-16 DIAGNOSIS — Z12.11 SCREENING FOR COLON CANCER: ICD-10-CM

## 2024-10-16 DIAGNOSIS — Z00.00 ROUTINE ADULT HEALTH MAINTENANCE: ICD-10-CM

## 2024-10-16 DIAGNOSIS — Z12.31 SCREENING MAMMOGRAM FOR BREAST CANCER: ICD-10-CM

## 2024-10-16 LAB
ALBUMIN SERPL-MCNC: 4.4 G/DL (ref 3.2–4.8)
ALBUMIN/GLOB SERPL: 1.8 {RATIO} (ref 1–2)
ALP LIVER SERPL-CCNC: 90 U/L
ALT SERPL-CCNC: 34 U/L
ANION GAP SERPL CALC-SCNC: 2 MMOL/L (ref 0–18)
AST SERPL-CCNC: 29 U/L (ref ?–34)
BASOPHILS # BLD AUTO: 0.05 X10(3) UL (ref 0–0.2)
BASOPHILS NFR BLD AUTO: 0.6 %
BILIRUB SERPL-MCNC: 0.4 MG/DL (ref 0.3–1.2)
BUN BLD-MCNC: 17 MG/DL (ref 9–23)
CALCIUM BLD-MCNC: 9.2 MG/DL (ref 8.7–10.4)
CHLORIDE SERPL-SCNC: 108 MMOL/L (ref 98–112)
CHOLEST SERPL-MCNC: 165 MG/DL (ref ?–200)
CO2 SERPL-SCNC: 28 MMOL/L (ref 21–32)
CREAT BLD-MCNC: 0.73 MG/DL
EGFRCR SERPLBLD CKD-EPI 2021: 101 ML/MIN/1.73M2 (ref 60–?)
EOSINOPHIL # BLD AUTO: 0.86 X10(3) UL (ref 0–0.7)
EOSINOPHIL NFR BLD AUTO: 11.1 %
ERYTHROCYTE [DISTWIDTH] IN BLOOD BY AUTOMATED COUNT: 13.5 %
FASTING PATIENT LIPID ANSWER: YES
FASTING STATUS PATIENT QL REPORTED: YES
GLOBULIN PLAS-MCNC: 2.5 G/DL (ref 2–3.5)
GLUCOSE BLD-MCNC: 92 MG/DL (ref 70–99)
HCT VFR BLD AUTO: 40.9 %
HDLC SERPL-MCNC: 55 MG/DL (ref 40–59)
HGB BLD-MCNC: 12.7 G/DL
IMM GRANULOCYTES # BLD AUTO: 0.02 X10(3) UL (ref 0–1)
IMM GRANULOCYTES NFR BLD: 0.3 %
LDLC SERPL CALC-MCNC: 95 MG/DL (ref ?–100)
LYMPHOCYTES # BLD AUTO: 2.39 X10(3) UL (ref 1–4)
LYMPHOCYTES NFR BLD AUTO: 30.8 %
MCH RBC QN AUTO: 26.3 PG (ref 26–34)
MCHC RBC AUTO-ENTMCNC: 31.1 G/DL (ref 31–37)
MCV RBC AUTO: 84.7 FL
MONOCYTES # BLD AUTO: 0.45 X10(3) UL (ref 0.1–1)
MONOCYTES NFR BLD AUTO: 5.8 %
NEUTROPHILS # BLD AUTO: 3.99 X10 (3) UL (ref 1.5–7.7)
NEUTROPHILS # BLD AUTO: 3.99 X10(3) UL (ref 1.5–7.7)
NEUTROPHILS NFR BLD AUTO: 51.4 %
NONHDLC SERPL-MCNC: 110 MG/DL (ref ?–130)
OSMOLALITY SERPL CALC.SUM OF ELEC: 287 MOSM/KG (ref 275–295)
PLATELET # BLD AUTO: 222 10(3)UL (ref 150–450)
POTASSIUM SERPL-SCNC: 4.1 MMOL/L (ref 3.5–5.1)
PROT SERPL-MCNC: 6.9 G/DL (ref 5.7–8.2)
RBC # BLD AUTO: 4.83 X10(6)UL
SODIUM SERPL-SCNC: 138 MMOL/L (ref 136–145)
T4 FREE SERPL-MCNC: 1.2 NG/DL (ref 0.8–1.7)
TRIGL SERPL-MCNC: 77 MG/DL (ref 30–149)
TSI SER-ACNC: 1.74 MIU/ML (ref 0.55–4.78)
VIT D+METAB SERPL-MCNC: 94.3 NG/ML (ref 30–100)
VLDLC SERPL CALC-MCNC: 13 MG/DL (ref 0–30)
WBC # BLD AUTO: 7.8 X10(3) UL (ref 4–11)

## 2024-10-16 PROCEDURE — 85025 COMPLETE CBC W/AUTO DIFF WBC: CPT

## 2024-10-16 PROCEDURE — 84443 ASSAY THYROID STIM HORMONE: CPT

## 2024-10-16 PROCEDURE — 84439 ASSAY OF FREE THYROXINE: CPT

## 2024-10-16 PROCEDURE — 3008F BODY MASS INDEX DOCD: CPT | Performed by: FAMILY MEDICINE

## 2024-10-16 PROCEDURE — 99396 PREV VISIT EST AGE 40-64: CPT | Performed by: FAMILY MEDICINE

## 2024-10-16 PROCEDURE — 3079F DIAST BP 80-89 MM HG: CPT | Performed by: FAMILY MEDICINE

## 2024-10-16 PROCEDURE — 82306 VITAMIN D 25 HYDROXY: CPT

## 2024-10-16 PROCEDURE — 36415 COLL VENOUS BLD VENIPUNCTURE: CPT

## 2024-10-16 PROCEDURE — 80053 COMPREHEN METABOLIC PANEL: CPT

## 2024-10-16 PROCEDURE — 80061 LIPID PANEL: CPT

## 2024-10-16 PROCEDURE — 3074F SYST BP LT 130 MM HG: CPT | Performed by: FAMILY MEDICINE

## 2024-10-16 RX ORDER — TIRZEPATIDE 2.5 MG/.5ML
0.5 INJECTION, SOLUTION SUBCUTANEOUS WEEKLY
Qty: 2 ML | Refills: 0 | Status: SHIPPED | OUTPATIENT
Start: 2024-10-16

## 2024-10-16 NOTE — TELEPHONE ENCOUNTER
Received incoming fax from pharmacy on Zepbound, needs PA. Will place fax in MA folder Key:[id number]

## 2024-10-17 NOTE — PROGRESS NOTES
HPI:   Neva Myers is a 49 year old female who presents for a complete physical exam.    Patient has no complaints today.    Wt Readings from Last 6 Encounters:   10/16/24 179 lb 12.8 oz (81.6 kg)   04/12/23 179 lb 8 oz (81.4 kg)   01/18/21 169 lb (76.7 kg)   09/16/19 172 lb 3.2 oz (78.1 kg)   08/28/19 171 lb 12.8 oz (77.9 kg)   06/15/18 168 lb (76.2 kg)     Body mass index is 31.85 kg/m².       Results for orders placed or performed in visit on 04/15/23   Folic Acid Serum (Folate)    Collection Time: 04/15/23  9:05 AM   Result Value Ref Range    Folate (Folic Acid) 21.8 >=8.7 ng/mL   Vitamin B12    Collection Time: 04/15/23  9:05 AM   Result Value Ref Range    Vitamin B12 623 193 - 986 pg/mL   TSH and Free T4    Collection Time: 04/15/23  9:05 AM   Result Value Ref Range    Free T4 1.0 0.8 - 1.7 ng/dL    TSH 1.470 0.358 - 3.740 mIU/mL   Lipid Panel    Collection Time: 04/15/23  9:05 AM   Result Value Ref Range    Cholesterol, Total 167 <200 mg/dL    HDL Cholesterol 65 (H) 40 - 59 mg/dL    Triglycerides 68 30 - 149 mg/dL    LDL Cholesterol 89 <100 mg/dL    VLDL 11 0 - 30 mg/dL    Non HDL Chol 102 <130 mg/dL    Patient Fasting for Lipid? Yes    Comp Metabolic Panel (14)    Collection Time: 04/15/23  9:05 AM   Result Value Ref Range    Glucose 97 70 - 99 mg/dL    Sodium 138 136 - 145 mmol/L    Potassium 4.0 3.5 - 5.1 mmol/L    Chloride 108 98 - 112 mmol/L    CO2 27.0 21.0 - 32.0 mmol/L    Anion Gap 3 0 - 18 mmol/L    BUN 12 7 - 18 mg/dL    Creatinine 0.72 0.55 - 1.02 mg/dL    Calcium, Total 9.0 8.5 - 10.1 mg/dL    Calculated Osmolality 286 275 - 295 mOsm/kg    eGFR-Cr 104 >=60 mL/min/1.73m2    AST 22 15 - 37 U/L    ALT 39 13 - 56 U/L    Alkaline Phosphatase 77 39 - 100 U/L    Bilirubin, Total 0.5 0.1 - 2.0 mg/dL    Total Protein 7.0 6.4 - 8.2 g/dL    Albumin 3.4 3.4 - 5.0 g/dL    Globulin  3.6 2.8 - 4.4 g/dL    A/G Ratio 0.9 (L) 1.0 - 2.0    Patient Fasting for CMP? Yes    CBC, Platelet; No Differential     Collection Time: 04/15/23  9:05 AM   Result Value Ref Range    WBC 5.0 4.0 - 11.0 x10(3) uL    RBC 4.83 3.80 - 5.30 x10(6)uL    HGB 12.9 12.0 - 16.0 g/dL    HCT 41.3 35.0 - 48.0 %    .0 150.0 - 450.0 10(3)uL    MCV 85.5 80.0 - 100.0 fL    MCH 26.7 26.0 - 34.0 pg    MCHC 31.2 31.0 - 37.0 g/dL    RDW 12.6 %   Vitamin D, 25-Hydroxy    Collection Time: 04/15/23  9:05 AM   Result Value Ref Range    Vitamin D, 25OH, Total 54.5 30.0 - 100.0 ng/mL        Current Outpatient Medications   Medication Sig Dispense Refill    Tirzepatide-Weight Management (ZEPBOUND) 2.5 MG/0.5ML Subcutaneous Solution Auto-injector Inject 0.5 mL into the skin once a week. 2 mL 0    cholecalciferol (VITAMIN D3) 5000 units Oral Cap Take 1 capsule (5,000 Units total) by mouth daily.      Multiple Vitamins-Minerals (MULTIVITAL-M) Oral Tab Take by mouth.        History reviewed. No pertinent past medical history.   History reviewed. No pertinent surgical history.   History reviewed. No pertinent family history.   Social History:   Social History     Socioeconomic History    Marital status:    Tobacco Use    Smoking status: Never    Smokeless tobacco: Never   Vaping Use    Vaping status: Never Used   Substance and Sexual Activity    Alcohol use: Yes     Comment: once every 2 months    Drug use: No    Sexual activity: Yes     Partners: Male   Other Topics Concern    Caffeine Concern Yes     Comment: 2 cups of coffee daily    Exercise Yes     Comment: 3-4 x per week    Seat Belt Yes     Social Drivers of Health      Received [facility], [facility]    Housing Stability        REVIEW OF SYSTEMS:   GENERAL: denies fevers, weakness, trouble sleeping or weight changes  SKIN: denies any unusual skin lesions or rashes  EYES:denies vision changes  HEENT: denies upper respiratory symptoms  LUNGS: denies cough or shortness of breath with exertion  CHEST:  denies breast changes or pain  CARDIOVASCULAR:  denies chest pain or tightness on exertion: no edema  VASCULAR: denies leg cramps  GI: denies abdominal pain, bowel movement changes, blood in stool  : denies urinary problems, vaginal discharge or discomfort   MUSCULOSKELETAL: denies joint pain or stiffness  NEURO: denies headaches, tingling or dizziness  PSYCHE: denies depression or anxiety  HEMATOLOGIC: denies bleeding abnormalities  ENDOCRINE: denies temperature intolerance, polyuria, or excessive sweating.  LYMPHATICS: denies swollen glands      EXAM:   /82   Pulse 80   Resp 18   Ht 63\"   Wt 179 lb 12.8 oz (81.6 kg)   LMP 09/22/2024 (Exact Date)   SpO2 98%   BMI 31.85 kg/m²   Body mass index is 31.85 kg/m².   GENERAL: well developed, well nourished and in no apparent distress  SKIN: no rashes,no suspicious lesions  HEENT: atraumatic, normocephalic,ears, nose and throat are normal  EYES: PERRLA, EOMI, sclera, conjunctiva are clear  NECK: supple,no adenopathy,no carotid bruits  CHEST: no chest tenderness  LUNGS: clear to auscultation bilateral, no rales, rhonchi or wheezing  CARDIO: S1S2 Normal sinus rhythm, no murmur, gallops, or rubs   ABD:  normal bowel sounds,soft, non tender, no masses, HSM or tenderness  MUSCULOSKELETAL: gait is normal, motor 5/5 all 4 extremities,   EXTREMITIES: no clubbing, cyanosis, or edema  NEURO: alert and oriented x 3, cranial nerves are grossly intact, no gross motor or sensory deficit.    ASSESSMENT AND PLAN:   Neva Myers is a 49 year old female who presents for a complete physical exam.      Pap and pelvic deferred to her gynecologist.      Pt educated on immunizations and health maintenance appropriate for age.     The patient verbalizes understanding of these recommendations and agrees to the plan.    The patient is asked to return for complete physical yearly.      There are no Patient Instructions on file for this visit.        Procedures    Comp Metabolic Panel (14)    CBC With Differential With Platelet     Lipid Panel    Vitamin D    TSH and Free T4    COLOGUARD COLON CANCER SCREENING (EXTERNAL)    [facility] EDUARDO 2D+3D SCREENING BILAT (CPT=77067/40315)

## 2024-10-18 DIAGNOSIS — E67.3 HIGH VITAMIN D LEVEL: ICD-10-CM

## 2024-10-18 DIAGNOSIS — Z86.39 H/O VITAMIN D DEFICIENCY: Primary | ICD-10-CM

## 2024-10-18 NOTE — TELEPHONE ENCOUNTER
PA submitted via CM.  Key: [id number]    Per CM:  Information regarding your request  Prior Authorization Not Available - This product is excluded from this benefit plan.      MCM sent to patient informing her of above and to call her insurance to see if there are any alternatives specifically for weight loss.

## 2024-11-05 ENCOUNTER — PATIENT MESSAGE (OUTPATIENT)
Dept: FAMILY MEDICINE CLINIC | Facility: CLINIC | Age: 49
End: 2024-11-05

## 2024-11-06 ENCOUNTER — PATIENT MESSAGE (OUTPATIENT)
Dept: FAMILY MEDICINE CLINIC | Facility: CLINIC | Age: 49
End: 2024-11-06

## 2024-11-06 DIAGNOSIS — E66.811 CLASS 1 OBESITY DUE TO EXCESS CALORIES WITHOUT SERIOUS COMORBIDITY WITH BODY MASS INDEX (BMI) OF 31.0 TO 31.9 IN ADULT: ICD-10-CM

## 2024-11-06 DIAGNOSIS — E66.09 CLASS 1 OBESITY DUE TO EXCESS CALORIES WITHOUT SERIOUS COMORBIDITY WITH BODY MASS INDEX (BMI) OF 31.0 TO 31.9 IN ADULT: ICD-10-CM

## 2024-11-07 RX ORDER — TIRZEPATIDE 2.5 MG/.5ML
0.5 INJECTION, SOLUTION SUBCUTANEOUS WEEKLY
Qty: 2 ML | Refills: 0 | Status: SHIPPED | OUTPATIENT
Start: 2024-11-07

## 2024-11-07 NOTE — TELEPHONE ENCOUNTER
It looks like it was order on last visit 10/16/24 but unsure if it was sent.    Cologuard order form along with demographics and copy of insurance faxed to [employer] at fax: [phone].      MCM sent to patient.

## 2024-11-21 LAB — AMB EXT COLOGUARD RESULT: NEGATIVE

## 2024-11-27 DIAGNOSIS — E66.811 CLASS 1 OBESITY DUE TO EXCESS CALORIES WITHOUT SERIOUS COMORBIDITY WITH BODY MASS INDEX (BMI) OF 31.0 TO 31.9 IN ADULT: ICD-10-CM

## 2024-11-27 DIAGNOSIS — E66.09 CLASS 1 OBESITY DUE TO EXCESS CALORIES WITHOUT SERIOUS COMORBIDITY WITH BODY MASS INDEX (BMI) OF 31.0 TO 31.9 IN ADULT: ICD-10-CM

## 2024-12-04 ENCOUNTER — TELEPHONE (OUTPATIENT)
Dept: FAMILY MEDICINE CLINIC | Facility: CLINIC | Age: 49
End: 2024-12-04

## 2024-12-04 ENCOUNTER — PATIENT MESSAGE (OUTPATIENT)
Dept: FAMILY MEDICINE CLINIC | Facility: CLINIC | Age: 49
End: 2024-12-04

## 2024-12-04 DIAGNOSIS — E66.811 CLASS 1 OBESITY DUE TO EXCESS CALORIES WITHOUT SERIOUS COMORBIDITY WITH BODY MASS INDEX (BMI) OF 31.0 TO 31.9 IN ADULT: ICD-10-CM

## 2024-12-04 DIAGNOSIS — E66.09 CLASS 1 OBESITY DUE TO EXCESS CALORIES WITHOUT SERIOUS COMORBIDITY WITH BODY MASS INDEX (BMI) OF 31.0 TO 31.9 IN ADULT: ICD-10-CM

## 2024-12-04 NOTE — TELEPHONE ENCOUNTER
Requesting Zepbound 2.5mg  Last OV: 10/16/24 Physical  RTC: 1 year  Last Rx'd 11/7/24 #2mL with 0 refills    No future appointments.    Non-protocol med:  Rx pended and routed for approval/denial

## 2024-12-04 NOTE — TELEPHONE ENCOUNTER
Patient calling to request refill for Zepboud.     Patient states she does not need the preloaded injection.     Patient states that she is out of medication.       [facility] - [place], [place] - [address]  [phone], [phone]  [address] [place]  [facility] [address]  Phone: [phone] Fax: [phone]

## 2024-12-05 ENCOUNTER — TELEPHONE (OUTPATIENT)
Dept: FAMILY MEDICINE CLINIC | Facility: CLINIC | Age: 49
End: 2024-12-05

## 2024-12-05 RX ORDER — TIRZEPATIDE 2.5 MG/.5ML
0.5 INJECTION, SOLUTION SUBCUTANEOUS WEEKLY
Qty: 2 ML | Refills: 0 | OUTPATIENT
Start: 2024-12-05

## 2024-12-05 RX ORDER — TIRZEPATIDE 2.5 MG/.5ML
2.5 INJECTION, SOLUTION SUBCUTANEOUS WEEKLY
Qty: 2 ML | Refills: 0 | Status: SHIPPED | OUTPATIENT
Start: 2024-12-05

## 2024-12-05 NOTE — TELEPHONE ENCOUNTER
RX was resent again today  Tirzepatide-Weight Management (ZEPBOUND) 2.5 MG/0.5ML Subcutaneous Solution 2 mL 0 12/5/2024 --    Sig - Route: Inject 2.5 mg into the skin once a week. DX E66.811 Please dispense vials - Subcutaneous    Sent to pharmacy as: Zepbound 2.5 MG/0.5ML Subcutaneous Solution (Tirzepatide-Weight Management)    Notes to Pharmacy: Patient Ph: [phone]    E-Prescribing Status: Receipt confirmed by pharmacy (12/5/2024  8:01 AM CST)

## 2024-12-05 NOTE — TELEPHONE ENCOUNTER
Duplicate request - Rx sent 12/5/24    Tirzepatide-Weight Management (ZEPBOUND) 2.5 MG/0.5ML Subcutaneous Solution 2 mL 0 12/5/2024 --    Sig - Route: Inject 2.5 mg into the skin once a week. DX E66.811 Please dispense vials - Subcutaneous    Sent to pharmacy as: Zepbound 2.5 MG/0.5ML Subcutaneous Solution (Tirzepatide-Weight Management)    Notes to Pharmacy: Patient Ph: [phone]    E-Prescribing Status: Receipt confirmed by pharmacy (12/5/2024  8:01 AM CST)        [facility] - [place] [id number]  [phone], [phone]

## 2024-12-12 ENCOUNTER — TELEPHONE (OUTPATIENT)
Dept: FAMILY MEDICINE CLINIC | Facility: CLINIC | Age: 49
End: 2024-12-12

## 2024-12-12 RX ORDER — TIRZEPATIDE 5 MG/.5ML
2.5 INJECTION, SOLUTION SUBCUTANEOUS WEEKLY
Qty: 2 ML | Refills: 1 | Status: SHIPPED | OUTPATIENT
Start: 2024-12-12

## 2024-12-12 NOTE — TELEPHONE ENCOUNTER
Patient called and said she doesn't feel like the Zepbound is doing anything for her.  She wants to get a dosage increase and she would like it sent to [facility] - [place], [place] - [facility]  [phone], [phone].  I told her she would need an appointment for a dose change and she said Dr. Silverman told her to just call the office.  I

## 2024-12-18 ENCOUNTER — TELEPHONE (OUTPATIENT)
Dept: FAMILY MEDICINE CLINIC | Facility: CLINIC | Age: 49
End: 2024-12-18

## 2024-12-18 NOTE — TELEPHONE ENCOUNTER
Received Cologuard results dated 11/21/24    Cologuard is Negative. Repeat in 3 years.  Results abstracted.   updated.  Patient informed via [employer]

## 2024-12-26 DIAGNOSIS — E66.09 CLASS 1 OBESITY DUE TO EXCESS CALORIES WITHOUT SERIOUS COMORBIDITY WITH BODY MASS INDEX (BMI) OF 31.0 TO 31.9 IN ADULT: ICD-10-CM

## 2024-12-26 DIAGNOSIS — E66.811 CLASS 1 OBESITY DUE TO EXCESS CALORIES WITHOUT SERIOUS COMORBIDITY WITH BODY MASS INDEX (BMI) OF 31.0 TO 31.9 IN ADULT: ICD-10-CM

## 2024-12-27 RX ORDER — TIRZEPATIDE 2.5 MG/.5ML
INJECTION, SOLUTION SUBCUTANEOUS
Qty: 2 ML | Refills: 0 | OUTPATIENT
Start: 2024-12-27

## 2025-01-09 ENCOUNTER — PATIENT MESSAGE (OUTPATIENT)
Dept: FAMILY MEDICINE CLINIC | Facility: CLINIC | Age: 50
End: 2025-01-09

## 2025-01-09 RX ORDER — TIRZEPATIDE 5 MG/.5ML
5 INJECTION, SOLUTION SUBCUTANEOUS WEEKLY
Qty: 2 ML | Refills: 1 | Status: SHIPPED | OUTPATIENT
Start: 2025-01-09 | End: 2025-01-10

## 2025-01-09 NOTE — TELEPHONE ENCOUNTER
Requesting Zepbound 5mg  Last OV: 10/16/24 Physical  RTC: 1 year  Last Rx'd 12/18/24 #2mL with 0 refills    No future appointments.    Patient comment: Hello, i have changed my insurance, want to try if Zepbound will be covered by it or not, can you please send this prescription to [facility] pharmacy in [facility]? If its not covered, I will ask to send it to [facility]. Thank you     Non-protocol med:  Rx pended and routed for approval/denial

## 2025-01-10 RX ORDER — TIRZEPATIDE 5 MG/.5ML
5 INJECTION, SOLUTION SUBCUTANEOUS WEEKLY
Qty: 2 ML | Refills: 1 | Status: SHIPPED | OUTPATIENT
Start: 2025-01-10

## 2025-01-22 ENCOUNTER — PATIENT MESSAGE (OUTPATIENT)
Dept: FAMILY MEDICINE CLINIC | Facility: CLINIC | Age: 50
End: 2025-01-22

## 2025-01-27 ENCOUNTER — TELEPHONE (OUTPATIENT)
Dept: FAMILY MEDICINE CLINIC | Facility: CLINIC | Age: 50
End: 2025-01-27

## 2025-01-27 NOTE — TELEPHONE ENCOUNTER
See TE from 1/27/2025.    Future Appointments   Date Time Provider Department Center   1/28/2025  8:40 AM Lemuel Allen APRN [facility] [address] [facility] [address]       
76yM with PMHx ESRD on HD (AV fistula LUE, MWF), COPD (2L home O2) with mild pulmonary hypertension, DVT s/p IVC filter, chronic hypotension on home midodrine 30mg q6h admitted to medicine for management of hypotension and MICU consulted for persistently low BP.  patient's currently blood pressure in the 80s, mentating well  would continue midodrine 30mg q6h  no signs of infectious etiology  would hold off of HD now, no emergent need for HD and follow up BP  reconsult as needed

## 2025-01-27 NOTE — TELEPHONE ENCOUNTER
Patient declined appointment.  Patient states she has sent a message to Primary Care Provider regarding medication Zepbound.  Patient is very tired and low in energy with upper body pain.      Patient will be leaving to go out of the county on Wednesday and cannot take appointment available.    Please call [phone]

## 2025-01-27 NOTE — TELEPHONE ENCOUNTER
Future Appointments   Date Time Provider Department Center   1/28/2025  8:40 AM Lemuel Allen APRN [facility] [address] [facility] [address]       Problem: Patient Care Overview  Goal: Plan of Care Review  Outcome: Ongoing (interventions implemented as appropriate)   04/04/18 1124   Coping/Psychosocial   Plan of Care Reviewed With patient   Plan of Care Review   Progress improving   OTHER   Outcome Summary pain well controlled with PO pain medication. ambulates independently. home today. VSS. educated about BP monitoring.      Goal: Individualization and Mutuality  Outcome: Ongoing (interventions implemented as appropriate)    Goal: Discharge Needs Assessment  Outcome: Ongoing (interventions implemented as appropriate)    Goal: Interprofessional Rounds/Family Conf  Outcome: Ongoing (interventions implemented as appropriate)      Problem: Pain, Acute (Adult)  Goal: Acceptable Pain Control/Comfort Level  Outcome: Ongoing (interventions implemented as appropriate)      Problem: Fall Risk (Adult)  Goal: Identify Related Risk Factors and Signs and Symptoms  Outcome: Outcome(s) achieved Date Met: 04/04/18    Goal: Absence of Fall  Outcome: Ongoing (interventions implemented as appropriate)

## 2025-01-28 ENCOUNTER — PATIENT MESSAGE (OUTPATIENT)
Dept: FAMILY MEDICINE CLINIC | Facility: CLINIC | Age: 50
End: 2025-01-28

## 2025-01-28 ENCOUNTER — OFFICE VISIT (OUTPATIENT)
Dept: FAMILY MEDICINE CLINIC | Facility: CLINIC | Age: 50
End: 2025-01-28
Payer: COMMERCIAL

## 2025-01-28 ENCOUNTER — LAB ENCOUNTER (OUTPATIENT)
Dept: LAB | Age: 50
End: 2025-01-28
Attending: FAMILY MEDICINE
Payer: COMMERCIAL

## 2025-01-28 VITALS
BODY MASS INDEX: 28.35 KG/M2 | HEIGHT: 63 IN | TEMPERATURE: 97 F | OXYGEN SATURATION: 98 % | RESPIRATION RATE: 16 BRPM | WEIGHT: 160 LBS | SYSTOLIC BLOOD PRESSURE: 110 MMHG | DIASTOLIC BLOOD PRESSURE: 78 MMHG | HEART RATE: 78 BPM

## 2025-01-28 DIAGNOSIS — R53.83 OTHER FATIGUE: ICD-10-CM

## 2025-01-28 DIAGNOSIS — E61.1 IRON DEFICIENCY: ICD-10-CM

## 2025-01-28 DIAGNOSIS — T88.7XXA MEDICATION SIDE EFFECT: ICD-10-CM

## 2025-01-28 DIAGNOSIS — R53.83 OTHER FATIGUE: Primary | ICD-10-CM

## 2025-01-28 LAB
ALBUMIN SERPL-MCNC: 4.7 G/DL (ref 3.2–4.8)
ALBUMIN/GLOB SERPL: 1.6 {RATIO} (ref 1–2)
ALP LIVER SERPL-CCNC: 73 U/L
ALT SERPL-CCNC: 44 U/L
ANION GAP SERPL CALC-SCNC: 10 MMOL/L (ref 0–18)
AST SERPL-CCNC: 30 U/L (ref ?–34)
BASOPHILS # BLD AUTO: 0.02 X10(3) UL (ref 0–0.2)
BASOPHILS NFR BLD AUTO: 0.2 %
BILIRUB SERPL-MCNC: 0.5 MG/DL (ref 0.3–1.2)
BUN BLD-MCNC: 14 MG/DL (ref 9–23)
CALCIUM BLD-MCNC: 9.9 MG/DL (ref 8.7–10.6)
CHLORIDE SERPL-SCNC: 104 MMOL/L (ref 98–112)
CO2 SERPL-SCNC: 26 MMOL/L (ref 21–32)
CREAT BLD-MCNC: 0.8 MG/DL
DEPRECATED HBV CORE AB SER IA-ACNC: 14 NG/ML
EGFRCR SERPLBLD CKD-EPI 2021: 91 ML/MIN/1.73M2 (ref 60–?)
EOSINOPHIL # BLD AUTO: 0.04 X10(3) UL (ref 0–0.7)
EOSINOPHIL NFR BLD AUTO: 0.3 %
ERYTHROCYTE [DISTWIDTH] IN BLOOD BY AUTOMATED COUNT: 13 %
EST. AVERAGE GLUCOSE BLD GHB EST-MCNC: 114 MG/DL (ref 68–126)
FASTING STATUS PATIENT QL REPORTED: YES
GLOBULIN PLAS-MCNC: 3 G/DL (ref 2–3.5)
GLUCOSE BLD-MCNC: 90 MG/DL (ref 70–99)
HBA1C MFR BLD: 5.6 % (ref ?–5.7)
HCT VFR BLD AUTO: 44.7 %
HGB BLD-MCNC: 14.4 G/DL
IMM GRANULOCYTES # BLD AUTO: 0.04 X10(3) UL (ref 0–1)
IMM GRANULOCYTES NFR BLD: 0.3 %
IRON SATN MFR SERPL: 13 %
IRON SERPL-MCNC: 41 UG/DL
LYMPHOCYTES # BLD AUTO: 2.28 X10(3) UL (ref 1–4)
LYMPHOCYTES NFR BLD AUTO: 19.9 %
MCH RBC QN AUTO: 27.4 PG (ref 26–34)
MCHC RBC AUTO-ENTMCNC: 32.2 G/DL (ref 31–37)
MCV RBC AUTO: 85.1 FL
MONOCYTES # BLD AUTO: 0.56 X10(3) UL (ref 0.1–1)
MONOCYTES NFR BLD AUTO: 4.9 %
NEUTROPHILS # BLD AUTO: 8.51 X10 (3) UL (ref 1.5–7.7)
NEUTROPHILS # BLD AUTO: 8.51 X10(3) UL (ref 1.5–7.7)
NEUTROPHILS NFR BLD AUTO: 74.4 %
OSMOLALITY SERPL CALC.SUM OF ELEC: 290 MOSM/KG (ref 275–295)
PLATELET # BLD AUTO: 265 10(3)UL (ref 150–450)
POTASSIUM SERPL-SCNC: 4.3 MMOL/L (ref 3.5–5.1)
PROT SERPL-MCNC: 7.7 G/DL (ref 5.7–8.2)
RBC # BLD AUTO: 5.25 X10(6)UL
SODIUM SERPL-SCNC: 140 MMOL/L (ref 136–145)
TOTAL IRON BINDING CAPACITY: 327 UG/DL (ref 250–425)
TRANSFERRIN SERPL-MCNC: 265 MG/DL (ref 250–380)
VIT D+METAB SERPL-MCNC: 104 NG/ML (ref 30–100)
WBC # BLD AUTO: 11.5 X10(3) UL (ref 4–11)

## 2025-01-28 PROCEDURE — 82306 VITAMIN D 25 HYDROXY: CPT

## 2025-01-28 PROCEDURE — 83036 HEMOGLOBIN GLYCOSYLATED A1C: CPT

## 2025-01-28 PROCEDURE — 83540 ASSAY OF IRON: CPT

## 2025-01-28 PROCEDURE — 83550 IRON BINDING TEST: CPT

## 2025-01-28 PROCEDURE — 85025 COMPLETE CBC W/AUTO DIFF WBC: CPT

## 2025-01-28 PROCEDURE — 80053 COMPREHEN METABOLIC PANEL: CPT

## 2025-01-28 PROCEDURE — 82728 ASSAY OF FERRITIN: CPT

## 2025-01-28 PROCEDURE — 99214 OFFICE O/P EST MOD 30 MIN: CPT | Performed by: FAMILY MEDICINE

## 2025-01-28 PROCEDURE — 36415 COLL VENOUS BLD VENIPUNCTURE: CPT

## 2025-01-28 NOTE — PROGRESS NOTES
Subjective:   Neva Myers is a 49 year old female who presents for Fatigue (Patient c/o side effects from zepbound, fatigue, dry mouth, chills, upper body pain x one wk )     The patient is a 49-year-old female who presents with complaints of fatigue, dry mouth, chills, and upper body pain that have been occurring for approximately one week. The patient attributes these symptoms to side effects from Zepbound, which she states that she took on Saturday when the symptoms started and where getting worse. She reports that the fatigue is persistent and significantly impacting her daily activities. Patient states that when she ate more sugar she felt better. The chills occur intermittently throughout the day without accompanying fever. The upper body pain is described as a diffuse, aching sensation that is not relieved by over-the-counter pain medications. She denies any recent changes in diet, sleep patterns, or stress levels. The patient has not experienced similar symptoms with the previous doses or at 2.5 mg  of Zepbound. The patient denies any other new symptoms, such as headaches, dizziness, or gastrointestinal disturbances.  History/Other:    Chief Complaint Reviewed and Verified  Nursing Notes Reviewed and   Verified  Tobacco Reviewed  Allergies Reviewed  Medications Reviewed    Problem List Reviewed  Medical History Reviewed  Surgical History   Reviewed  Family History Reviewed  Social History Reviewed         Tobacco:  She has never smoked tobacco.    Current Outpatient Medications   Medication Sig Dispense Refill    Iron, Ferrous Sulfate, 325 (65 Fe) MG Oral Tab Take 1 tablet by mouth daily. 30 tablet 0    Tirzepatide-Weight Management (ZEPBOUND) 5 MG/0.5ML Subcutaneous Solution Auto-injector Inject 5 mg into the skin once a week. Inject 5 mg into the skin once a week. DX E66.811 Please dispense vials 2 mL 1    cholecalciferol (VITAMIN D3) 5000 units Oral Cap Take 1 capsule (5,000 Units total) by  mouth daily.      Multiple Vitamins-Minerals (MULTIVITAL-M) Oral Tab Take by mouth.           Review of Systems:  Review of Systems   Constitutional:  Positive for activity change, chills and fatigue. Negative for appetite change and fever.        General malaise described as weakness by patient.   HENT:  Negative for congestion, postnasal drip, rhinorrhea, sinus pressure, sinus pain, sneezing, sore throat and voice change.    Eyes: Negative.    Respiratory:  Negative for apnea, cough, choking, chest tightness, shortness of breath, wheezing and stridor.    Cardiovascular:  Negative for chest pain, palpitations and leg swelling.   Gastrointestinal: Negative.  Negative for abdominal distention, abdominal pain, constipation, diarrhea, nausea and vomiting.   Musculoskeletal: Negative.    Allergic/Immunologic: Negative.    Neurological:  Negative for dizziness, light-headedness and headaches.   Hematological: Negative.    Psychiatric/Behavioral: Negative.         Objective:   /78   Pulse 78   Temp 97.3 °F (36.3 °C) (Temporal)   Resp 16   Ht 5' 3\" (1.6 m)   Wt 160 lb (72.6 kg)   LMP 11/26/2024   SpO2 98%   BMI 28.34 kg/m²  Estimated body mass index is 28.34 kg/m² as calculated from the following:    Height as of this encounter: 5' 3\" (1.6 m).    Weight as of this encounter: 160 lb (72.6 kg).  Physical Exam  Constitutional:       General: She is not in acute distress.     Appearance: Normal appearance. She is well-developed and normal weight. She is not ill-appearing.   HENT:      Head: Normocephalic and atraumatic.      Nose: Nose normal.      Mouth/Throat:      Mouth: Mucous membranes are moist.      Pharynx: Oropharynx is clear.   Eyes:      Conjunctiva/sclera: Conjunctivae normal.   Cardiovascular:      Rate and Rhythm: Normal rate and regular rhythm.      Heart sounds: Normal heart sounds.   Pulmonary:      Effort: Pulmonary effort is normal.      Breath sounds: Normal breath sounds.   Abdominal:       Palpations: Abdomen is soft.   Musculoskeletal:         General: Normal range of motion.      Cervical back: Normal range of motion and neck supple.   Skin:     General: Skin is warm and dry.      Capillary Refill: Capillary refill takes less than 2 seconds.   Neurological:      General: No focal deficit present.      Mental Status: She is alert and oriented to person, place, and time.   Psychiatric:         Mood and Affect: Mood normal.         Behavior: Behavior normal.         Thought Content: Thought content normal.         Judgment: Judgment normal.         Assessment & Plan:   1. Other fatigue (Primary)  -     CBC With Differential With Platelet; Future; Expected date: 01/28/2025  -     Comp Metabolic Panel (14); Future; Expected date: 01/28/2025  -     Hemoglobin A1C; Future; Expected date: 01/28/2025  -     Vitamin D; Future; Expected date: 01/28/2025  -     Iron And Tibc; Future; Expected date: 01/28/2025  -     Ferritin; Future; Expected date: 01/28/2025    2. Medication side effect   Patient concerned that the zepbound has made her feel this way and she is very concerned because she is going to [facility] for 2.5 weeks and is worried about low glucose. Patient instructed to stop medication will restart at 2.5 mg of zepbound, which is the dosage she did not have the symptoms, when she returns.     3. Iron deficiency  -     Iron (Ferrous Sulfate); Take 1 tablet by mouth daily.  Dispense: 30 tablet; Refill: 0    Follow up in 3-4 weeks, will reassess symptoms and possibly restart the medication.       JOEL Roman, 1/28/2025, 1:59 PM

## 2025-02-06 RX ORDER — TIRZEPATIDE 5 MG/.5ML
INJECTION, SOLUTION SUBCUTANEOUS
Qty: 2 ML | Refills: 0 | OUTPATIENT
Start: 2025-02-06

## 2025-02-28 ENCOUNTER — PATIENT MESSAGE (OUTPATIENT)
Dept: FAMILY MEDICINE CLINIC | Facility: CLINIC | Age: 50
End: 2025-02-28

## 2025-03-03 NOTE — TELEPHONE ENCOUNTER
You can start with 5 mg however there could be chance of having more side effects initially which are usually GI side effects with this type of medicine which include nausea and constipation.

## 2025-03-03 NOTE — TELEPHONE ENCOUNTER
LOV 1/28/2025 w/Lemuel for fatigue  2. Medication side effect   Patient concerned that the zepbound has made her feel this way and she is very concerned because she is going to [place] for 2.5 weeks and is worried about low glucose. Patient instructed to stop medication will restart at 2.5 mg of zepbound, which is the dosage she did not have the symptoms, when she returns.     Follow up in 3-4 weeks, will reassess symptoms and possibly restart the medication.        Please advise.

## 2025-03-19 RX ORDER — TIRZEPATIDE 5 MG/.5ML
5 INJECTION, SOLUTION SUBCUTANEOUS WEEKLY
Qty: 2 ML | Refills: 1 | Status: SHIPPED | OUTPATIENT
Start: 2025-03-19

## 2025-03-19 NOTE — TELEPHONE ENCOUNTER
Tirzepatide-Weight Management (ZEPBOUND) 5 MG/0.5ML Subcutaneous Solution Auto-injector          Sig: Inject 5 mg into the skin once a week. Inject 5 mg into the skin once a week. DX E66.811 Please dispense vials    Disp: 2 mL    Refills: 1    Start: 3/18/2025    Class: Normal    Non-formulary    To pharmacy: Patient Ph: [phone]    Last ordered: 2 months ago (1/10/2025) by Tyrel Silverman MD       To be filled at: [facility] [address] [phone], [phone]     No future appointments.    LOV: 1/28/25  Last r/f: 1/10/25 # 1 0 r/fs  Labs: 1/28/25     RTC: none    Please advise

## 2025-05-21 ENCOUNTER — OFFICE VISIT (OUTPATIENT)
Dept: FAMILY MEDICINE CLINIC | Facility: CLINIC | Age: 50
End: 2025-05-21
Payer: COMMERCIAL

## 2025-05-21 VITALS
TEMPERATURE: 97 F | WEIGHT: 162 LBS | BODY MASS INDEX: 28.7 KG/M2 | HEART RATE: 88 BPM | RESPIRATION RATE: 14 BRPM | OXYGEN SATURATION: 97 % | HEIGHT: 63 IN | DIASTOLIC BLOOD PRESSURE: 72 MMHG | SYSTOLIC BLOOD PRESSURE: 98 MMHG

## 2025-05-21 DIAGNOSIS — Z12.31 SCREENING MAMMOGRAM FOR BREAST CANCER: ICD-10-CM

## 2025-05-21 DIAGNOSIS — E66.09 CLASS 1 OBESITY DUE TO EXCESS CALORIES WITHOUT SERIOUS COMORBIDITY WITH BODY MASS INDEX (BMI) OF 31.0 TO 31.9 IN ADULT: ICD-10-CM

## 2025-05-21 DIAGNOSIS — E61.1 IRON DEFICIENCY: ICD-10-CM

## 2025-05-21 DIAGNOSIS — Z00.00 ROUTINE ADULT HEALTH MAINTENANCE: Primary | ICD-10-CM

## 2025-05-21 DIAGNOSIS — N95.1 PERIMENOPAUSE: ICD-10-CM

## 2025-05-21 DIAGNOSIS — E55.9 VITAMIN D DEFICIENCY: ICD-10-CM

## 2025-05-21 DIAGNOSIS — E66.811 CLASS 1 OBESITY DUE TO EXCESS CALORIES WITHOUT SERIOUS COMORBIDITY WITH BODY MASS INDEX (BMI) OF 31.0 TO 31.9 IN ADULT: ICD-10-CM

## 2025-05-22 NOTE — PROGRESS NOTES
The following individual(s) verbally consented to be recorded using ambient AI listening technology and understand that they can each withdraw their consent to this listening technology at any point by asking the clinician to turn off or pause the recording:    Patient name: Neva Myers  Additional names:  no  Subjective:   Neva Myers is a 49 year old female who presents for Physical (Labs completed)       History/Other:   History of Present Illness  Neva Myers is a 49 year old female who presents for blood work and evaluation of irregular menstrual cycles.    She has not had a menstrual period since the first week of February. Over the past week, she experienced a few drops of blood and symptoms associated with menstruation, but not a full period. She is concerned about whether this irregularity is due to her medication, Zepbound, or if it is related to perimenopause. No significant changes in her menstrual cycle related to weight loss or medication use.    She mentions that her vitamin D levels were high in January, and she is not currently taking any vitamin D supplements. She experiences low energy when her vitamin D is deficient and wants to recheck her levels. Additionally, her iron levels were low, and she has been taking supplements for this.    She has been using Zepbound for weight management and has lost about 20 pounds. However, after stopping the medication for a month during a trip to [facility], she felt hungry again and did not lose any more weight despite taking additional doses. She has experienced side effects such as nausea and constipation when restarting the medication after a break.    Her hemoglobin A1c test in the past was 5.6. She has also completed a Cologuard test in the fall of last year and a mammogram with her gynecologist at [facility].    Her sister, who is three and a half years older, has not yet reached menopause.   Chief Complaint Reviewed and Verified  Nursing Notes Reviewed  and   Verified  Tobacco Reviewed  Allergies Reviewed  Medications Reviewed    Medical History Reviewed  Surgical History Reviewed  Family History   Reviewed  Social History Reviewed         Tobacco:  She has never smoked tobacco.    Current Medications[1]    PHQ-2 SCORE: 0  , done 5/21/2025   Last [place] Suicide Screening on 5/21/2025 was No Risk.       Review of Systems:  Pertinent items are noted in HPI.  A comprehensive review of systems was negative.      Objective:   BP 98/72   Pulse 88   Temp 96.9 °F (36.1 °C) (Temporal)   Resp 14   Ht 5' 3\" (1.6 m)   Wt 162 lb (73.5 kg)   LMP 11/26/2024   SpO2 97%   BMI 28.70 kg/m²  Estimated body mass index is 28.7 kg/m² as calculated from the following:    Height as of this encounter: 5' 3\" (1.6 m).    Weight as of this encounter: 162 lb (73.5 kg).  Results  LABS  Hemoglobin A1c: 5.6% (01/2025)     Physical Exam      Eyes:  PERRLA, EOMI, conjunctiva non-injected, sclera non-injected and non-icteric.  HENT:  normocephalic, atraumatic, external ear canals clear bilaterally, tympanic membranes appear opaque, non-bulging, non-erythematous, nasal turbinates are non-inflamed and not swollen, oropharynx without erythema, swelling, or exudate, gingiva and lips without any apparent lesions.   Cardiac:  S1 S2 regular rate and rhythm, no murmur, gallop, or rub  Respiratory:  Bilaterally clear to auscultation, no chest tenderness to palpation, no wheezing, no rhonchi, no rales, air entry is adequate, no accessory respiratory muscle use, no chest asymmetry, normal excursion.  GI:  bowel sound positive in all four quadrants, abdomen is soft, non-tender, non-distended, no hepatosplenomegaly, no abnormal aortic pulsation.  MS:  No paraspinal or spinal tenderness, negative straight leg raise bilaterally, no joint swelling or tenderness, normal strength, range of motion and sensation in all four extremities.  Neurologic:  alert and oriented to time, space, and person,  cranial nerves two through twelve grossly intact, two plus deep tendon reflexes in all four extremities, gait is normal, negative Romberg sign, coordination with finger to nose and heel to shin intact. No focal neurologic deficit noted.  Psychiatric:  mood and affect are normal, no apparent thought disorder, judgement and insight appear intact.      Assessment & Plan:   1. Routine adult health maintenance (Primary)  2. Screening mammogram for breast cancer  3. Perimenopause  4. Vitamin D deficiency  5. Iron deficiency  6. Class 1 obesity due to excess calories without serious comorbidity with body mass index (BMI) of 31.0 to 31.9 in adult    Assessment & Plan  Perimenopause  Irregular menstrual cycles likely due to perimenopause. At age 51, experiencing symptoms consistent with perimenopausal transition, including lighter periods, skipped cycles, or unpredictable timing. Not related to weight loss or Zepbound use.  - Provide education on perimenopause and its symptoms  - Advise to be prepared for unpredictable menstrual cycles    Vitamin D deficiency  Previously elevated vitamin D levels in January. Currently not taking vitamin D supplements. Plans to reassess vitamin D status.  - Order blood test to recheck vitamin D levels    Iron deficiency  Low iron levels managed with supplements. Plans to reassess iron status.  - Order blood test to recheck iron levels    Weight management  Current use of Zepbound with 20-pound weight loss. Experiencing side effects at 5 mg dose. Discussed potential switch to Rybelsus, which may have fewer side effects and similar efficacy. Rybelsus may cause less nausea compared to Zepbound. Discussed risk of weight regain if medication is stopped and dietary habits are not maintained.  - Prescribe Rybelsus starting at 3 mg with instructions for dose escalation  - Advise not to take Rybelsus and Zepbound simultaneously  - Discuss potential side effects of Rybelsus, including nausea and  constipation  - Attempt to process Rybelsus through insurance  - Advise to monitor response to Rybelsus and report before running out of medication    Follow-up  Plans for follow-up testing and monitoring discussed.  - Order complete blood test including liver, kidney, cholesterol, and blood sugar  - Advise to complete blood work at convenience  - Confirm mammogram and Cologuard tests are up to date        No follow-ups on file.        Tyrel Silverman MD, 5/22/2025, 10:00 AM             [1]   Current Outpatient Medications   Medication Sig Dispense Refill    Tirzepatide-Weight Management (ZEPBOUND) 5 MG/0.5ML Subcutaneous Solution Auto-injector Inject 5 mg into the skin once a week. Inject 5 mg into the skin once a week. DX E66.811 Please dispense vials 2 mL 1    cholecalciferol (VITAMIN D3) 5000 units Oral Cap Take 1 capsule (5,000 Units total) by mouth daily.      Multiple Vitamins-Minerals (MULTIVITAL-M) Oral Tab Take by mouth.

## 2025-05-23 ENCOUNTER — TELEPHONE (OUTPATIENT)
Dept: FAMILY MEDICINE CLINIC | Facility: CLINIC | Age: 50
End: 2025-05-23

## 2025-05-23 ENCOUNTER — LAB ENCOUNTER (OUTPATIENT)
Dept: LAB | Age: 50
End: 2025-05-23
Attending: FAMILY MEDICINE
Payer: COMMERCIAL

## 2025-05-23 DIAGNOSIS — E55.9 VITAMIN D DEFICIENCY: Primary | ICD-10-CM

## 2025-05-23 DIAGNOSIS — E66.09 CLASS 1 OBESITY DUE TO EXCESS CALORIES WITHOUT SERIOUS COMORBIDITY WITH BODY MASS INDEX (BMI) OF 31.0 TO 31.9 IN ADULT: ICD-10-CM

## 2025-05-23 DIAGNOSIS — E61.1 IRON DEFICIENCY: ICD-10-CM

## 2025-05-23 DIAGNOSIS — E66.811 CLASS 1 OBESITY DUE TO EXCESS CALORIES WITHOUT SERIOUS COMORBIDITY WITH BODY MASS INDEX (BMI) OF 31.0 TO 31.9 IN ADULT: ICD-10-CM

## 2025-05-28 LAB
% SATURATION: 28 % (CALC) (ref 16–45)
ABSOLUTE BASOPHILS: 30 CELLS/UL (ref 0–200)
ABSOLUTE EOSINOPHILS: 30 CELLS/UL (ref 15–500)
ABSOLUTE LYMPHOCYTES: 1550 CELLS/UL (ref 850–3900)
ABSOLUTE MONOCYTES: 335 CELLS/UL (ref 200–950)
ABSOLUTE NEUTROPHILS: 3055 CELLS/UL (ref 1500–7800)
ALBUMIN/GLOBULIN RATIO: 1.5 (CALC) (ref 1–2.5)
ALBUMIN: 4 G/DL (ref 3.6–5.1)
ALKALINE PHOSPHATASE: 67 U/L (ref 31–125)
ALT: 22 U/L (ref 6–29)
AST: 19 U/L (ref 10–35)
BASOPHILS: 0.6 %
BILIRUBIN, TOTAL: 0.4 MG/DL (ref 0.2–1.2)
BUN: 15 MG/DL (ref 7–25)
CALCIUM: 9.3 MG/DL (ref 8.6–10.2)
CARBON DIOXIDE: 25 MMOL/L (ref 20–32)
CHLORIDE: 107 MMOL/L (ref 98–110)
CHOL/HDLC RATIO: 2.6 (CALC)
CHOLESTEROL, TOTAL: 177 MG/DL
CREATININE: 0.6 MG/DL (ref 0.5–0.99)
EGFR: 111 ML/MIN/1.73M2
EOSINOPHILS: 0.6 %
FERRITIN: 14 NG/ML (ref 16–232)
GLOBULIN: 2.6 G/DL (CALC) (ref 1.9–3.7)
GLUCOSE: 93 MG/DL (ref 65–99)
HDL CHOLESTEROL: 69 MG/DL
HEMATOCRIT: 41 % (ref 35–45)
HEMOGLOBIN: 13.1 G/DL (ref 11.7–15.5)
IRON BINDING CAPACITY: 337 MCG/DL (CALC) (ref 250–450)
IRON, TOTAL: 93 MCG/DL (ref 40–190)
LDL-CHOLESTEROL: 94 MG/DL (CALC)
LYMPHOCYTES: 31 %
MCH: 28.2 PG (ref 27–33)
MCHC: 32 G/DL (ref 32–36)
MCV: 88.4 FL (ref 80–100)
MONOCYTES: 6.7 %
MPV: 11.2 FL (ref 7.5–12.5)
NEUTROPHILS: 61.1 %
NON-HDL CHOLESTEROL: 108 MG/DL (CALC)
PLATELET COUNT: 200 THOUSAND/UL (ref 140–400)
POTASSIUM: 4.1 MMOL/L (ref 3.5–5.3)
PROTEIN, TOTAL: 6.6 G/DL (ref 6.1–8.1)
RDW: 13.1 % (ref 11–15)
RED BLOOD CELL COUNT: 4.64 MILLION/UL (ref 3.8–5.1)
SODIUM: 140 MMOL/L (ref 135–146)
TRIGLYCERIDES: 55 MG/DL
TSH W/REFLEX TO FT4: 1.36 MIU/L
VITAMIN D, 25-OH, TOTAL: 44 NG/ML (ref 30–100)
WHITE BLOOD CELL COUNT: 5 THOUSAND/UL (ref 3.8–10.8)

## 2025-05-29 ENCOUNTER — PATIENT MESSAGE (OUTPATIENT)
Dept: FAMILY MEDICINE CLINIC | Facility: CLINIC | Age: 50
End: 2025-05-29

## 2025-05-29 DIAGNOSIS — E61.1 IRON DEFICIENCY: Primary | ICD-10-CM

## 2025-05-29 NOTE — TELEPHONE ENCOUNTER
You can take iron tablet once a day or multivitamin with iron once a day.     I would then recheck iron, tibc, ferritin in 3 months.

## 2025-06-02 DIAGNOSIS — E66.09 CLASS 1 OBESITY DUE TO EXCESS CALORIES WITHOUT SERIOUS COMORBIDITY WITH BODY MASS INDEX (BMI) OF 31.0 TO 31.9 IN ADULT: ICD-10-CM

## 2025-06-02 DIAGNOSIS — E66.811 CLASS 1 OBESITY DUE TO EXCESS CALORIES WITHOUT SERIOUS COMORBIDITY WITH BODY MASS INDEX (BMI) OF 31.0 TO 31.9 IN ADULT: ICD-10-CM

## 2025-06-02 RX ORDER — TIRZEPATIDE 5 MG/.5ML
5 INJECTION, SOLUTION SUBCUTANEOUS WEEKLY
Qty: 2 ML | Refills: 1 | Status: CANCELLED | OUTPATIENT
Start: 2025-06-02

## 2025-06-03 NOTE — TELEPHONE ENCOUNTER
Requesting Zepbound  Last OV: 5/21/25 Physical  RTC: not noted  Last Rx'd 5mg rx'd 3/19/25 #2mL with 1 refill    No future appointments.    Patient is requesting Rx for Zepbound 2.5mg, states she is restarting medication.    Non-protocol med:  Rx pended and routed for approval/denial

## 2025-06-05 RX ORDER — TIRZEPATIDE 2.5 MG/.5ML
0.5 INJECTION, SOLUTION SUBCUTANEOUS WEEKLY
Qty: 2 ML | Refills: 0 | Status: SHIPPED | OUTPATIENT
Start: 2025-06-05

## 2025-08-14 RX ORDER — TIRZEPATIDE 5 MG/.5ML
5 INJECTION, SOLUTION SUBCUTANEOUS WEEKLY
Qty: 2 ML | Refills: 1 | Status: SHIPPED | OUTPATIENT
Start: 2025-08-14

## 2025-08-23 ENCOUNTER — PATIENT MESSAGE (OUTPATIENT)
Dept: FAMILY MEDICINE CLINIC | Facility: CLINIC | Age: 50
End: 2025-08-23